# Patient Record
Sex: FEMALE | Race: WHITE | NOT HISPANIC OR LATINO | Employment: STUDENT | URBAN - METROPOLITAN AREA
[De-identification: names, ages, dates, MRNs, and addresses within clinical notes are randomized per-mention and may not be internally consistent; named-entity substitution may affect disease eponyms.]

---

## 2020-08-14 ENCOUNTER — OFFICE VISIT (OUTPATIENT)
Dept: FAMILY MEDICINE CLINIC | Facility: CLINIC | Age: 20
End: 2020-08-14

## 2020-08-14 ENCOUNTER — HOSPITAL ENCOUNTER (OUTPATIENT)
Dept: GENERAL RADIOLOGY | Facility: HOSPITAL | Age: 20
Discharge: HOME | End: 2020-08-14
Admitting: FAMILY MEDICINE

## 2020-08-14 VITALS
SYSTOLIC BLOOD PRESSURE: 118 MMHG | RESPIRATION RATE: 16 BRPM | OXYGEN SATURATION: 99 % | HEIGHT: 67 IN | DIASTOLIC BLOOD PRESSURE: 64 MMHG | TEMPERATURE: 98.2 F | BODY MASS INDEX: 20.25 KG/M2 | HEART RATE: 76 BPM | WEIGHT: 129 LBS

## 2020-08-14 DIAGNOSIS — S39.012S STRAIN OF LUMBAR REGION, SEQUELA: ICD-10-CM

## 2020-08-14 DIAGNOSIS — Z00.00 HEALTH CARE MAINTENANCE: Primary | ICD-10-CM

## 2020-08-14 DIAGNOSIS — N94.6 DYSMENORRHEA: ICD-10-CM

## 2020-08-14 DIAGNOSIS — L72.3 SEBACEOUS CYST: ICD-10-CM

## 2020-08-14 PROCEDURE — 90715 TDAP VACCINE 7 YRS/> IM: CPT | Performed by: FAMILY MEDICINE

## 2020-08-14 PROCEDURE — 90471 IMMUNIZATION ADMIN: CPT | Performed by: FAMILY MEDICINE

## 2020-08-14 PROCEDURE — 72100 X-RAY EXAM L-S SPINE 2/3 VWS: CPT

## 2020-08-14 PROCEDURE — 90472 IMMUNIZATION ADMIN EACH ADD: CPT | Performed by: FAMILY MEDICINE

## 2020-08-14 PROCEDURE — 99204 OFFICE O/P NEW MOD 45 MIN: CPT | Performed by: FAMILY MEDICINE

## 2020-08-14 PROCEDURE — 90734 MENACWYD/MENACWYCRM VACC IM: CPT | Performed by: FAMILY MEDICINE

## 2020-08-14 NOTE — PROGRESS NOTES
"Augustina Aguila is a 19 y.o. female.     History of Present Illness     The following portions of the patient's history were reviewed and updated as appropriate: {history reviewed:20406::\"allergies\",\"current medications\",\"past family history\",\"past medical history\",\"past social history\",\"past surgical history\",\"problem list\"}.  Vitals:    08/14/20 1122   BP: 118/64   Pulse: 76   Resp: 16   Temp: 98.2 °F (36.8 °C)   SpO2: 99%     Body mass index is 20.2 kg/m².  Review of Systems    Objective   Physical Exam        Assessment/Plan   {Assess/PlanSmartLinks:77268}           "

## 2020-08-14 NOTE — PROGRESS NOTES
"Augustina Aguila is a 19 y.o. female and is here for a comprehensive physical exam. The patient reports back pain.   Establish care  1. Back pain- started 2 years ago after lifting and felt low back pain. 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 Has had tingling down right leg. Pain can also be worse with sitting. Has taken Ibuprofen and muscle relaxant cream little relief. Has used ice and heat some relief. Currently working at [employer]. Standing does not hurt back.   2. Birth control- [facility] Has had for 8-9 months. Continues to have bleeding and pain with periods and was told that this should get better on IUD.   3. Lump on front of right leg, non tender    Last health maintenance visit was > 1 year . Overall they feel their health is good . Lives with domestic partner  Occupation is at [employer]. Patient's diet is in general, a \"healthy\" diet  . Exercises regularly irregularly . Tobacco use Never used. Alcohol use is none . Illicit drug no history of illicit drug use    Screening Tests  Vision Impairment. Uses Glasses/Contacts   Hearingnormal  Dental: Brushes does teeth twice a day . Dental exam every six months?yes  Mammogram up to date n/a  Pap smear n/a  Colonoscopy n/a  Dexa Scan n/a    Do you take any herbs or supplements that were not prescribed by a doctor? no  Are you taking calcium supplements? no  Are you taking aspirin daily? no  Family history of ovarian cancer? no  Family history of breast cancer? no  FH of endometrial cancer? no  FH of cervical cancer? no  FH of colon cancer? no       History:  LMP: No LMP recorded.    Last pap date: n/a  Abnormal pap? n/a  : 0  Para: 0    Immunization History  Tdap? yes  HPV? yes  Pneumonia? not applicable  Shingles? not applicable    The following portions of the patient's history were reviewed and updated as appropriate: allergies, current " medications, past family history, past medical history, past social history, past surgical history and problem list.    History reviewed. No pertinent past medical history.    Family History   Problem Relation Age of Onset   • Epilepsy Mother    • ADD / ADHD Father        Past Surgical History:   Procedure Laterality Date   • DENTAL PROCEDURE  2017    molar pulled       Social History     Socioeconomic History   • Marital status: Single     Spouse name: Not on file   • Number of children: Not on file   • Years of education: Not on file   • Highest education level: Not on file   Tobacco Use   • Smoking status: Never Smoker   • Smokeless tobacco: Never Used   Substance and Sexual Activity   • Alcohol use: Never     Frequency: Never   • Drug use: Never   • Sexual activity: Defer       Review of Systems  Do you have pain that bothers you in your daily life? no  Review of Systems   Constitutional: Negative.  Negative for activity change, fatigue, fever and unexpected weight change.   HENT: Negative.  Negative for congestion, sneezing and sore throat.    Eyes: Negative.  Negative for visual disturbance.   Respiratory: Negative.  Negative for cough, chest tightness, shortness of breath and wheezing.    Cardiovascular: Negative.  Negative for chest pain, palpitations and leg swelling.   Gastrointestinal: Negative.  Negative for abdominal distention, abdominal pain, blood in stool, constipation, diarrhea and nausea.   Endocrine: Negative.  Negative for cold intolerance and heat intolerance.   Genitourinary: Negative.  Negative for dysuria, frequency and urgency.   Musculoskeletal: Negative.  Negative for arthralgias and myalgias.   Skin: Negative.  Negative for rash.   Allergic/Immunologic: Negative.    Neurological: Negative.  Negative for dizziness, syncope and numbness.   Hematological: Negative.  Negative for adenopathy.   Psychiatric/Behavioral: Negative.  Negative for suicidal ideas. The patient is not nervous/anxious.         Objective   Physical Exam   Constitutional: She is oriented to person, place, and time. She appears well-developed and well-nourished. No distress.   HENT:   Right Ear: External ear normal.   Left Ear: External ear normal.   Nose: Nose normal.   Mouth/Throat: Oropharynx is clear and moist.   Eyes: Pupils are equal, round, and reactive to light. Conjunctivae and EOM are normal.   Neck: Normal range of motion. Neck supple. No thyromegaly present.   Cardiovascular: Normal rate, regular rhythm and normal heart sounds.   No murmur heard.  Pulmonary/Chest: Effort normal and breath sounds normal. No respiratory distress. She has no wheezes.   Abdominal: Soft. Bowel sounds are normal. She exhibits no distension and no mass. There is no tenderness. There is no rebound and no guarding. No hernia.   Musculoskeletal: Normal range of motion. She exhibits no edema.        Lumbar back: She exhibits tenderness and spasm.   Lymphadenopathy:     She has no cervical adenopathy.   Neurological: She is alert and oriented to person, place, and time. She has normal reflexes.   Skin: Skin is warm and dry. No rash noted. She is not diaphoretic. No erythema. No pallor.   Small seb cyst anterior right lower leg, nontender   Psychiatric: She has a normal mood and affect. Her behavior is normal. Judgment and thought content normal.   Nursing note and vitals reviewed.       Diagnoses and all orders for this visit:    Health care maintenance  -     Tdap Vaccine Greater Than or Equal To 8yo IM  -     Discontinue: meningococcal polysaccharide (MENACTRA) injection 0.5 mL  -     meningococcal (MENVEO) vaccine 0.5 mL    Dysmenorrhea  -     Ambulatory Referral to Obstetrics / Gynecology    Strain of lumbar region, sequela  -     XR Spine Lumbar 2 or 3 View; Future  -     Ambulatory Referral to Physical Therapy Evaluate and treat    Sebaceous cyst         Patient Counseling:   --BMI: Discussed that it is acceptable and appropriate                  Plan.  --Nutrition: Stressed importance of moderation in sodium/caffeine intake, saturated fat and cholesterol, caloric balance, sufficient intake of fresh fruits, vegetables, fiber, calcium, iron, and 1 mg of folate supplement per day (for females capable of pregnancy).  ---Exercise: Stressed the importance of regular exercise.   --Substance Abuse: Discussed cessation/primary prevention of tobacco, alcohol, or other drug use; driving or other dangerous activities under the influence; availability of treatment for abuse.    --Sexuality: Discussed sexually transmitted diseases, partner selection, use of condoms, avoidance of unintended pregnancy  and contraceptive alternatives.   --Injury prevention: Discussed safety belts, safety helmets, smoke detector, smoking near bedding or upholstery.   --Dental health: Discussed importance of regular tooth brushing, flossing, and dental visits.  --Immunizations reviewed.  --Discussed benefits of mammogram  --Discussed benefits of screening colonoscopy.  --After hours service discussed with patient    Discussed the nature of the disease including, risks, complications, implications, management, safe and proper use of medications. Encouraged therapeutic lifestyle changes including healthy diet with plenty of fruits and vegetables, daily exercise, medication compliance, and keeping scheduled follow up appointments with me and any other providers. Encouraged patient to have appointment for complete physical, fasting labs, appropriate screenings, and immunizations on an annual basis.       Follow up as needed for acute illness

## 2020-08-15 PROBLEM — Z01.419 WELL WOMAN EXAM: Status: ACTIVE | Noted: 2020-08-15

## 2020-08-20 ENCOUNTER — OFFICE VISIT (OUTPATIENT)
Dept: OBSTETRICS AND GYNECOLOGY | Facility: CLINIC | Age: 20
End: 2020-08-20

## 2020-08-20 VITALS
BODY MASS INDEX: 21.93 KG/M2 | WEIGHT: 140 LBS | RESPIRATION RATE: 14 BRPM | DIASTOLIC BLOOD PRESSURE: 74 MMHG | SYSTOLIC BLOOD PRESSURE: 118 MMHG

## 2020-08-20 DIAGNOSIS — Z97.5 IUD (INTRAUTERINE DEVICE) IN PLACE: ICD-10-CM

## 2020-08-20 DIAGNOSIS — N92.6 IRREGULAR MENSES: Primary | ICD-10-CM

## 2020-08-20 PROCEDURE — 99203 OFFICE O/P NEW LOW 30 MIN: CPT | Performed by: OBSTETRICS & GYNECOLOGY

## 2020-08-20 NOTE — PROGRESS NOTES
Subjective   Chief Complaint   Patient presents with   • Gynecologic Exam       Daisy Aguila is a 19 y.o. year old No obstetric history on file..  Patient's last menstrual period was 08/01/2020 (approximate).  She presents because of wanting to get her IUD checked.  She had a Kyleena placed in early October 2019.  She wants to get it checked because she is that she is been having some unpredictable cycles pelvic pain in bilateral lower quadrants in the suprapubic region.  The unpredictable bleeding and the pain neither are bad enough that she would want her IUD removed if we can prove that is correctly positioned.    The following portions of the patient's history were reviewed and updated as appropriate:current medications, allergies, past family history, past medical history, past social history and past surgical history    Social History    Tobacco Use      Smoking status: Never Smoker      Smokeless tobacco: Never Used         Objective   /74   Resp 14   Wt 63.5 kg (140 lb)   LMP 08/01/2020 (Approximate)   Breastfeeding No   BMI 21.93 kg/m²     Lab Review   No data reviewed    Imaging   No data reviewed        Assessment   1. Irregular menses with Kyleena in place.  This is a new problem that does require additional work-up     Plan   1. Ultrasound needs to be done any time that is convenient for her.   2. The importance of keeping all planned follow-up and taking all medications as prescribed was emphasized.  3. Follow up after ultrasound          This note was electronically signed.    Tutu Boyce M.D.  August 20, 2020    Total time spent today with Daisy  was 35 minutes (level 3).  Off this time, 90% was spent face-to-face time coordinating care, answering her questions and counseling regarding pathophysiology of her presenting problem along with plans for any diagnositc work-up and treatment.    Note: Speech recognition transcription software may have been used to create  portions of this document.  An attempt at proofreading has been made but errors in transcription could still be present.

## 2020-08-24 ENCOUNTER — TELEPHONE (OUTPATIENT)
Dept: FAMILY MEDICINE CLINIC | Facility: CLINIC | Age: 20
End: 2020-08-24

## 2020-08-24 NOTE — TELEPHONE ENCOUNTER
Caller: Daisy Aguila    Relationship: Self    Best call back number:[phone]     Medication needed:   PATIENT STATES SHE SAW TR[facility]GADO ON 8/14/20 FOR A NEW PATIENT APPT AND WAS TOLD 3 MEDICATION WOULD BE SENT IN FOR HER AND NOTHING WAS SENT IN.    PATIENT WAS ALSO REQUESTING RESULTS FROM HER X-RAY      What is the patient's preferred pharmacy: [facility] [address] AT [facility] & [address] - [phone]  - [phone] FX

## 2020-08-25 DIAGNOSIS — S39.012S STRAIN OF LUMBAR REGION, SEQUELA: Primary | ICD-10-CM

## 2020-08-25 RX ORDER — CYCLOBENZAPRINE HCL 10 MG
5 TABLET ORAL NIGHTLY PRN
Qty: 30 TABLET | Refills: 0 | Status: SHIPPED | OUTPATIENT
Start: 2020-08-25 | End: 2021-06-17

## 2020-08-28 ENCOUNTER — TREATMENT (OUTPATIENT)
Dept: PHYSICAL THERAPY | Facility: CLINIC | Age: 20
End: 2020-08-28

## 2020-08-28 DIAGNOSIS — M54.41 CHRONIC MIDLINE LOW BACK PAIN WITH RIGHT-SIDED SCIATICA: Primary | ICD-10-CM

## 2020-08-28 DIAGNOSIS — G89.29 CHRONIC MIDLINE LOW BACK PAIN WITH RIGHT-SIDED SCIATICA: Primary | ICD-10-CM

## 2020-08-28 PROCEDURE — 97110 THERAPEUTIC EXERCISES: CPT | Performed by: PHYSICAL THERAPIST

## 2020-08-28 PROCEDURE — 97161 PT EVAL LOW COMPLEX 20 MIN: CPT | Performed by: PHYSICAL THERAPIST

## 2020-08-28 NOTE — PATIENT INSTRUCTIONS
Issued initial HEP including LTR, ITB stretch, prayer stretch, hamstring stretches to be performed 2x/day

## 2020-08-28 NOTE — PROGRESS NOTES
Physical Therapy Initial Evaluation and Plan of Care      Patient: Daisy Aguila   : 2000  Diagnosis/ICD-10 Code:  Chronic midline low back pain with right-sided sciatica [M54.41, G89.29]  Referring practitioner: Mariangel Khan DO  Date of Initial Visit: 2020  Today's Date: 2020  Patient seen for 1 sessions           Subjective Questionnaire: Oswestry:   Subjective Evaluation    History of Present Illness  Mechanism of injury: Pt reports that for the past few years she has dealt w/ lower back pn. Two years ago, helped her grandmother carry sodas in from the car, bent down to pick them up and felt a snap in her back. Was unable to get out of bed for 2 days. Was given mm relaxer and exercises from her PCP, but haven't helped. Back in  bent forward to  a small child she was watching and had another episode of severe sharp pn. Has sharp pn if sitting for long periods, even if she tries to correct her posture. Can also occur w/ prolonged standing. Pn located across both sides of her low back, occasionally will radiate down her right leg to her knee. Denies numbness, tingling, weakness, etc.    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    Hand dominance: right    Diagnostic Tests  MRI studies: normal    Treatments  Previous treatment: medication  Current treatment: medication  Patient Goals  Patient goals for therapy: decreased pain, increased motion, increased strength, independence with ADLs/IADLs and return to sport/leisure activities           Treatment               Objective          Tenderness     Additional Tenderness Details  No TTP    Neurological Testing     Sensation     Lumbar   Left   Intact: light touch    Right   Intact:  light touch    Reflexes   Left   Patellar (L4): normal (2+)  Achilles (S1): normal (2+)    Right   Patellar (L4): normal (2+)  Achilles (S1): normal (2+)    Active Range of Motion     Additional Active Range of Motion Details  Trunk Flxn fingertips to mid/distal shin w/ pn midline lower back   Trunk Extn WNL without pn  Trunk Rotation L WNL R WNL without pn  Trunk SB L WNL   R WNL without pn      Passive Range of Motion     Additional Passive Range of Motion Details  Mild pn, hypomobility w/ unilateral PA glides lumbar segments bilaterally    Strength/Myotome Testing     Left Hip   Planes of Motion   Flexion: 5  Extension: 4+  Abduction: 5  External rotation: 5    Right Hip   Planes of Motion   Flexion: 5  Extension: 4+  Abduction: 5  External rotation: 5    Left Knee   Flexion: 5  Extension: 5    Right Knee   Flexion: 5  Extension: 5    Left Ankle/Foot   Dorsiflexion: 5  Plantar flexion: 5  Great toe extension: 5    Right Ankle/Foot   Dorsiflexion: 5  Plantar flexion: 5  Great toe extension: 5    Tests       Thoracic   Negative slump.     Lumbar     Left   Negative passive SLR and quadrant.     Right   Negative passive SLR and quadrant.     Left Pelvic Girdle/Sacrum   Negative: sacrum compression, gapping, sacral spring and thigh thrust.     Right Pelvic Girdle/Sacrum   Negative: sacrum compression, gapping, sacral spring and thigh thrust.     Left Hip Flexibility Comments:   Hamstring: mild impairment  Quad: no impairment  Hip Flexor: mild impairment  Piriformis: no impairment  IT Band: mild impairment  Gastroc: no impairment  Soleus: no impairment      Right Hip Flexibility Comments:   Hamstring: mild impairment  Quad: no impairment  Hip Flexor: mild impairment  Piriformis: no impairment  IT Band: mild impairment  Gastroc: no impairment  Soleus: no impairment          Assessment & Plan     Assessment  Impairments: abnormal or restricted ROM, activity intolerance, impaired physical strength, lacks appropriate home  exercise program and pain with function  Assessment details: Pt is a 19 YOF who presents to PT w/ evolving symptoms of low complexity. She reports chronic mid line lower back pn w/ occasional pn radiating into the lateral/anterior thigh. (-) distal LE symptoms, paresthesias. She exhibits full trunk mobility w/ report of mild pn and stretching at end range forward bending. She is hypomobile throughout the lumbar segments and exhibits impaired flexibility of the hamstrings, ITBs, lumbar paraspinals. Strength overall intact. SLR/slump (-), SI provocation tests (-). Pt's pn and deficits limit her ability to tolerate daily and work activities. She may benefit from skilled PT services to address her deficits, decrease pn, and maximize function.  Barriers to therapy: none  Prognosis: good  Functional Limitations: carrying objects, lifting, sleeping, walking, pulling, pushing, uncomfortable because of pain, sitting, standing and stooping  Goals  Plan Goals: STG 6 wks  1) Pt to be compliant w/ initial HEP for ROM, strength and symptom mgmt.  2) Pt to report at least a 70% improvement in symptoms.  3) Pt to exhibit full trunk mobility in all planes w/o pn.  4) Pt to exhibit impaired hamstring, ITB flexibility.  5) Pt to improve Mod Oswestry score to 7/50 or better to reflect improved pn and function.        Plan  Therapy options: will be seen for skilled physical therapy services  Planned modality interventions: TENS, thermotherapy (hydrocollator packs), traction and ultrasound  Planned therapy interventions: abdominal trunk stabilization, ADL retraining, body mechanics training, flexibility, functional ROM exercises, home exercise program, joint mobilization, manual therapy, neuromuscular re-education, postural training, soft tissue mobilization, spinal/joint mobilization, strengthening, stretching and therapeutic activities  Frequency: 1x week  Duration in visits: 10  Treatment plan discussed with: patient  Plan details: PT  POC to emphasize restoration of pn free trunk mobility, flexibility, core/hip strength, segmental mobility utilizing therex, manual therapy techniques, modalities as indicated for pn control.        Timed:  Manual Therapy:    0     mins  85817;  Therapeutic Exercise:    8     mins  77552;     Neuromuscular Dylan:    0    mins  10741;    Therapeutic Activity:     0     mins  87947;     Gait Trainin     mins  32511;     Ultrasound:     0     mins  35067;    Electrical Stimulation:    0     mins  41695 ( );    Untimed:  Electrical Stimulation:    0     mins  93418 ( );  Mechanical Traction:    0     mins  25543;     Timed Treatment:   8   mins   Total Treatment:     40   mins    PT SIGNATURE: Bev Ridley, PT   DATE TREATMENT INITIATED: 2020    Initial Certification  Certification Period: 2020  I certify that the therapy services are furnished while this patient is under my care.  The services outlined above are required by this patient, and will be reviewed every 90 days.     PHYSICIAN: Mariangel Khan,       DATE:     Please sign and return via fax to [phone].. Thank you, [facility].

## 2020-09-03 ENCOUNTER — TREATMENT (OUTPATIENT)
Dept: PHYSICAL THERAPY | Facility: CLINIC | Age: 20
End: 2020-09-03

## 2020-09-03 DIAGNOSIS — M54.41 CHRONIC MIDLINE LOW BACK PAIN WITH RIGHT-SIDED SCIATICA: Primary | ICD-10-CM

## 2020-09-03 DIAGNOSIS — G89.29 CHRONIC MIDLINE LOW BACK PAIN WITH RIGHT-SIDED SCIATICA: Primary | ICD-10-CM

## 2020-09-03 PROCEDURE — 97110 THERAPEUTIC EXERCISES: CPT | Performed by: PHYSICAL THERAPIST

## 2020-09-03 NOTE — PROGRESS NOTES
Physical Therapy Daily Progress Note  Visit: 2      Patient: Daisy Aguila   : 2000  Referring practitioner: Mariangel Khan, DO  Visit Diagnosis:     ICD-10-CM ICD-9-CM   1. Chronic midline low back pain with right-sided sciatica M54.41 724.2    G89.29 724.3     338.29     Date of Initial Visit: Type: THERAPY  Noted: 2020  Today's Date: 9/3/2020        Subjective     Daisy Aguila reports: Pt states she has been under stress, so yesterday pn was elevated. No pn at time of visit this morning but feels tightness in the center of her lower back.    Treatment  Pre Treatment Pn Score: 0  Post Treatment Pn Score:  0    Exercise 1  Exercise Name 1: seated hamstring stretch  Sets/Reps 1: 3  Time: 15 sec  Exercise 2  Exercise Name 2: standing ITB stretch  Sets/Reps 2: 3  Time 2: 15 sec  Exercise 3  Exercise Name 3: 3 way prayer stretch  Sets/Reps 3: 3  Time 3: 15 sec  Exercise 4  Exercise Name 4: cat/camel  Sets/Reps 4: 10  Exercise 5  Exercise Name 5: LTR  Sets/Reps 5: 10  Exercise 6  Exercise Name 6: PPT  Sets/Reps 6: 15  Exercise 7  Exercise Name 7: LE glides  Sets/Reps 7: 15  Exercise 8  Exercise Name 8: birddogs  Sets/Reps 8: 15  Exercise 9  Exercise Name 9: bridges  Sets/Reps 9: 30            Objective      Reports tightness mid low back w/ forward bending, ROM WNL     Assessment & Plan     Assessment  Assessment details: Pt compliant w/ initial HEP. She reports mild soreness from initial evaluation but subjectively reports some relief from her stretches. Reviewed stretches in clinic, followed by initiation of low level trans ab isometrics, core stab. Pt tolerated these very well, reported quite a bit of challenge w/ bird dogs but performed w/ good technique. Reviewed palpation of core stabilizers to ensure proper technique w/ PPT. Added PPT, LE glides, and bird dogs to HEP.    Plan  Plan details: Cont w/ POC               Timed:  Manual Therapy:    0     mins  66008;  Therapeutic  Exercise:    38     mins  08497;     Neuromuscular Dylan:    0    mins  82969;    Therapeutic Activity:     0     mins  20861;     Gait Trainin     mins  90323;     Ultrasound:     0     mins  04756;    Electrical Stimulation:    0     mins  74596 ( );    Untimed:  Electrical Stimulation:    0     mins  97154 ( );  Mechanical Traction:    0     mins  92523;     Timed Treatment:   38   mins   Total Treatment:     38   mins      Bev Ridley, PT  Physical Therapist

## 2020-09-11 ENCOUNTER — TREATMENT (OUTPATIENT)
Dept: PHYSICAL THERAPY | Facility: CLINIC | Age: 20
End: 2020-09-11

## 2020-09-11 DIAGNOSIS — G89.29 CHRONIC MIDLINE LOW BACK PAIN WITH RIGHT-SIDED SCIATICA: Primary | ICD-10-CM

## 2020-09-11 DIAGNOSIS — M54.41 CHRONIC MIDLINE LOW BACK PAIN WITH RIGHT-SIDED SCIATICA: Primary | ICD-10-CM

## 2020-09-11 PROCEDURE — 97110 THERAPEUTIC EXERCISES: CPT | Performed by: PHYSICAL THERAPIST

## 2020-09-11 NOTE — PROGRESS NOTES
Physical Therapy Daily Progress Note  Visit: 3      Patient: Daisy Aguila   : 2000  Referring practitioner: Mariangel Khan, DO  Visit Diagnosis:     ICD-10-CM ICD-9-CM   1. Chronic midline low back pain with right-sided sciatica M54.41 724.2    G89.29 724.3     338.29     Date of Initial Visit: Type: THERAPY  Noted: 2020  Today's Date: 2020        Subjective     Daisy Aguila reports: Pt states her pn has been increased over the past week, she believes due to stress. However she did have quite a bit of relief after leaving her last PT appt.    Treatment  Pre Treatment Pn Score: 5  Post Treatment Pn Score:  3    Exercise 1  Exercise Name 1: cat/camel stretch  Sets/Reps 1: 10 ea  Exercise 2  Exercise Name 2: 3 way prayer stretch  Sets/Reps 2: 3  Time 2: 15 sec  Exercise 3  Exercise Name 3: quadruped multifidus lifts  Sets/Reps 3: 20 ea  Exercise 4  Exercise Name 4: birddog  Sets/Reps 4: 20  Exercise 5  Exercise Name 5: PPT  Sets/Reps 5: 30  Exercise 6  Exercise Name 6: LE glides  Sets/Reps 6: 30  Exercise 7  Exercise Name 7: cyclying  Sets/Reps 7: 30  Exercise 8  Exercise Name 8: Paloff presses  Equipment/Resistance 8: GTB  Sets/Reps 8: 20 ea            Objective       Assessment & Plan     Assessment  Assessment details: Pt c/o increased pn over the past week due to stress. She notes some pn relief w/ her HEP. She demonstrates good carryover w/ PPT technique, and was able to progress today to cycling w/ trans ab isometric as well as standing paloff presses w/ TB. Issued TB and discussed progressing to presses and cycling at home.    Plan  Plan details: Cont w/ POC- may add plank activities at table edge               Timed:  Manual Therapy:    0     mins  25263;  Therapeutic Exercise:    25     mins  22408;     Neuromuscular Dylan:    0    mins  51676;    Therapeutic Activity:     0     mins  22708;     Gait Trainin     mins  20807;     Ultrasound:     0     mins  56375;     Electrical Stimulation:    0     mins  11565 ( );    Untimed:  Electrical Stimulation:    0     mins  91684 ( );  Mechanical Traction:    0     mins  60091;     Timed Treatment:   25   mins   Total Treatment:     28   mins      Bev Ridley, PT  Physical Therapist

## 2020-09-18 ENCOUNTER — TREATMENT (OUTPATIENT)
Dept: PHYSICAL THERAPY | Facility: CLINIC | Age: 20
End: 2020-09-18

## 2020-09-18 DIAGNOSIS — G89.29 CHRONIC MIDLINE LOW BACK PAIN WITH RIGHT-SIDED SCIATICA: Primary | ICD-10-CM

## 2020-09-18 DIAGNOSIS — M54.41 CHRONIC MIDLINE LOW BACK PAIN WITH RIGHT-SIDED SCIATICA: Primary | ICD-10-CM

## 2020-09-18 PROCEDURE — 97110 THERAPEUTIC EXERCISES: CPT | Performed by: PHYSICAL THERAPIST

## 2020-09-18 NOTE — PROGRESS NOTES
Physical Therapy Daily Progress Note  Visit: 4      Patient: Daisy Aguila   : 2000  Referring practitioner: Mariangel Khan, DO  Visit Diagnosis:     ICD-10-CM ICD-9-CM   1. Chronic midline low back pain with right-sided sciatica  M54.41 724.2    G89.29 724.3     338.29     Date of Initial Visit: Type: THERAPY  Noted: 2020  Today's Date: 2020        Subjective     Daisy Aguila reports: Pt states that pn is getting much better. Had a little pn on Wednesday but did her HEP 3x that day and alleviated it completely. Feels that she is getting stronger, exercises getting easier.    Treatment  Pre Treatment Pn Score: 0  Post Treatment Pn Score:  0    Exercise 1  Exercise Name 1: quadruped multifidus lifts  Sets/Reps 1: 20 ea  Exercise 2  Exercise Name 2: birddog  Sets/Reps 2: 20  Exercise 3  Exercise Name 3: deadbug  Sets/Reps 3: 20  Exercise 4  Exercise Name 4: paloff presses  Equipment/Resistance 4: GTB  Sets/Reps 4: 20 ea  Exercise 5  Exercise Name 5: high plank w/ mountain climbers  Sets/Reps 5: 20  Exercise 6  Exercise Name 6: high plank w/ bird dog  Sets/Reps 6: 30            Objective       Assessment & Plan     Assessment  Assessment details: Pt subjectively reports significant improvement in back pn, strength. She is tolerating exercise progressions very well. She exhibits appropriate technique w/ all and denies any pn. She appears to be adequately challenged w/ current HEP. Discussed options for exercise progression. No pn at end of visit.    Plan  Plan details: Cont w/ POC               Timed:  Manual Therapy:    0     mins  17744;  Therapeutic Exercise:    24     mins  07546;     Neuromuscular Dylan:    0    mins  31812;    Therapeutic Activity:     0     mins  13145;     Gait Trainin     mins  76695;     Ultrasound:     0     mins  08187;    Electrical Stimulation:    0     mins  52210 ( );    Untimed:  Electrical Stimulation:    0     mins  90898 (  );  Mechanical Traction:    0     mins  60318;     Timed Treatment:   24   mins   Total Treatment:     26   mins      Bev Ridley, PT  Physical Therapist

## 2020-09-25 ENCOUNTER — TREATMENT (OUTPATIENT)
Dept: PHYSICAL THERAPY | Facility: CLINIC | Age: 20
End: 2020-09-25

## 2020-09-25 DIAGNOSIS — M54.41 CHRONIC MIDLINE LOW BACK PAIN WITH RIGHT-SIDED SCIATICA: Primary | ICD-10-CM

## 2020-09-25 DIAGNOSIS — G89.29 CHRONIC MIDLINE LOW BACK PAIN WITH RIGHT-SIDED SCIATICA: Primary | ICD-10-CM

## 2020-09-25 PROCEDURE — 97110 THERAPEUTIC EXERCISES: CPT | Performed by: PHYSICAL THERAPIST

## 2020-09-25 NOTE — PROGRESS NOTES
Re-Assessment / Re-Certification      Patient: Daisy Aguila   : 2000  Diagnosis/ICD-10 Code:  Chronic midline low back pain with right-sided sciatica [M54.41, G89.29]  Referring practitioner: Mariangel Khan DO  Date of Initial Visit: Type: THERAPY  Noted: 2020  Today's Date: 2020  Patient seen for 5 sessions      Subjective:   Subjective Questionnaire: Oswestry:   Clinical Progress: improved  Home Program Compliance: Yes  Treatment has included: therapeutic exercise and manual therapy    Subjective    Daisy Aguila reports: Pt states that overall her pn is much better. She feels that the exercises are helping to make her stronger. She did have some pn in the low back the past couple of nights. She slept on her stomach and when she woke felt some pn in the low back, but subsided immediately. She reports continued pn in anterior thighs/knees when sitting long periods, usually when driving.    Pre tx pn score: 0  Post tx pn score: 0      Treatment  Exercise 1  Exercise Name 1: Reassessment  Exercise 2  Exercise Name 2: dead bug  Sets/Reps 2: 30  Exercise 3  Exercise Name 3: bridge w/ alternating leg extn  Sets/Reps 3: 15 ea  Exercise 4  Exercise Name 4: bird dogs  Sets/Reps 4: 30  Exercise 5  Exercise Name 5: bird dog w/ knee/elbow touches  Sets/Reps 5: 15 ea  Exercise 6  Exercise Name 6: high plank w/ mountain climbers  Sets/Reps 6: 30  Exercise 7  Exercise Name 7: high plank w/ bird dog  Sets/Reps 7: 30                   Objective          Tenderness     Additional Tenderness Details  No TTP    Neurological Testing     Sensation     Lumbar   Left   Intact: light touch    Right   Intact: light touch    Reflexes   Left   Patellar (L4): normal (2+)  Achilles (S1): normal (2+)    Right   Patellar (L4): normal (2+)  Achilles (S1): normal (2+)    Active Range of Motion     Additional Active Range of Motion Details  Trunk Flxn fingertips to toes, mild stretch/discomfort B low back  Trunk  Extn WNL without pn  Trunk Rotation L WNL R WNL without pn  Trunk SB L WNL   R WNL without pn      Passive Range of Motion     Additional Passive Range of Motion Details  No pn or mobility deficits unilateral PA glides lumbar segments bilaterally    Strength/Myotome Testing     Left Hip   Planes of Motion   Flexion: 5  Extension: 5  Abduction: 5  External rotation: 5    Right Hip   Planes of Motion   Flexion: 5  Extension: 5  Abduction: 5  External rotation: 5    Left Knee   Flexion: 5  Extension: 5    Right Knee   Flexion: 5  Extension: 5    Left Ankle/Foot   Dorsiflexion: 5  Plantar flexion: 5  Great toe extension: 5    Right Ankle/Foot   Dorsiflexion: 5  Plantar flexion: 5  Great toe extension: 5    Tests       Thoracic   Negative slump.     Lumbar     Left   Negative passive SLR and quadrant.     Right   Negative passive SLR and quadrant.     Left Pelvic Girdle/Sacrum   Negative: sacrum compression, gapping, sacral spring and thigh thrust.     Right Pelvic Girdle/Sacrum   Negative: sacrum compression, gapping, sacral spring and thigh thrust.     Left Hip Flexibility Comments:   Hamstring: no impairment  Quad: no impairment  Hip Flexor: no impairment  Piriformis: no impairment  IT Band: mild impairment  Gastroc: no impairment  Soleus: no impairment      Right Hip Flexibility Comments:   Hamstring: no impairment  Quad: no impairment  Hip Flexor: no impairment  Piriformis: no impairment  IT Band: mild impairment  Gastroc: no impairment  Soleus: no impairment          Assessment & Plan     Assessment  Impairments: abnormal or restricted ROM, impaired physical strength and pain with function  Assessment details: Reassessment performed today. Pt has completed 5 visits w/ interventions focusing on core stabilization, hip strengthening, flexibility, and joint mobility. She has progressed very well w/ PT. She exhibits full trunk mobility w/ only slight discomfort/tightness end range forward flexion. BLE strength 5/5 in  all planes. LE flexibility improved, though B ITBs remain slightly limited. She has tolerated core stab progressions very well. She is making good progress toward her goals and would benefit from continued PT to further improve core strength and endurance. Issued updated HEP today as outlined in chart.  Barriers to therapy: none  Prognosis: good  Functional Limitations: carrying objects, lifting, pulling, pushing, uncomfortable because of pain, sitting and stooping  Goals  Plan Goals: STG 6 wks  1) Pt to be compliant w/ initial HEP for ROM, strength and symptom mgmt.-MET  2) Pt to report at least a 70% improvement in symptoms.-MET  3) Pt to exhibit full trunk mobility in all planes w/o pn.-PARTIALLY MET  4) Pt to exhibit restored hamstring, ITB flexibility.-PARTIALLY MET  5) Pt to improve Mod Oswestry score to 7/50 or better to reflect improved pn and function.-MET        Plan  Therapy options: will be seen for skilled physical therapy services  Planned modality interventions: TENS, thermotherapy (hydrocollator packs), traction and ultrasound  Planned therapy interventions: abdominal trunk stabilization, ADL retraining, body mechanics training, flexibility, functional ROM exercises, home exercise program, joint mobilization, manual therapy, neuromuscular re-education, postural training, soft tissue mobilization, spinal/joint mobilization, strengthening, stretching and therapeutic activities  Frequency: 1x week  Duration in visits: 5  Treatment plan discussed with: patient  Plan details: Cont w/ current POC, focusing on maximizing core strength and endurance, hip strength, flexibility.      Progress toward previous goals: Partially Met        PT Signature: Bev Ridley, PT        Timed:  Manual Therapy:    0     mins  22183;  Therapeutic Exercise:    30     mins  84424;     Neuromuscular Dylan:    0    mins  47754;    Therapeutic Activity:     0     mins  17009;     Gait Trainin     mins  99340;      Ultrasound:     0     mins  99409;    Electrical Stimulation:    0     mins  31510 ( );    Untimed:  Electrical Stimulation:    0     mins  75402 ( );  Mechanical Traction:    0     mins  14361;     Timed Treatment:   30   mins   Total Treatment:     32   mins

## 2020-09-25 NOTE — PATIENT INSTRUCTIONS
Additions to HEP: bridge w/ alternating knee extn, dead bug, bird dog w/ knee to elbow touches, high plank mountain climbers, and high plank bird dogs

## 2020-10-01 ENCOUNTER — TELEPHONE (OUTPATIENT)
Dept: PHYSICAL THERAPY | Facility: CLINIC | Age: 20
End: 2020-10-01

## 2020-10-01 NOTE — TELEPHONE ENCOUNTER
Patient called hub to reschedule for something before 1:45 on 10/2. I could not find anything available and could not get anyone to answer the phone at [facility]. Rescheduled patient for 10/22 but if you can get her in sooner, please contact her as she would like to be seen before then. Thanks

## 2020-10-02 ENCOUNTER — TREATMENT (OUTPATIENT)
Dept: PHYSICAL THERAPY | Facility: CLINIC | Age: 20
End: 2020-10-02

## 2020-10-02 DIAGNOSIS — M54.41 CHRONIC MIDLINE LOW BACK PAIN WITH RIGHT-SIDED SCIATICA: Primary | ICD-10-CM

## 2020-10-02 DIAGNOSIS — G89.29 CHRONIC MIDLINE LOW BACK PAIN WITH RIGHT-SIDED SCIATICA: Primary | ICD-10-CM

## 2020-10-02 PROCEDURE — 97110 THERAPEUTIC EXERCISES: CPT | Performed by: PHYSICAL THERAPIST

## 2020-10-02 NOTE — PROGRESS NOTES
Discharge Summary      Patient: Daisy Aguila   : 2000  Diagnosis/ICD-10 Code:  Chronic midline low back pain with right-sided sciatica [M54.41, G89.29]  Referring practitioner: Mariangel Khan DO  Date of Initial Visit: Type: THERAPY  Noted: 2020  Today's Date: 10/2/2020  Patient seen for 6 sessions      Subjective:   Subjective Questionnaire: Oswestry:   Clinical Progress: improved  Home Program Compliance: Yes  Treatment has included: therapeutic exercise and manual therapy    Subjective    Daisy Aguila reports: Pt states that she has had minimal to no discomfort over the past week. She feels challenged by her exercises and is able to perform them w/o pn. She feels that her back is much stronger, hips are less tight.    Pre tx pn score: 0  Post tx pn score: 0      Treatment  Exercise 1  Exercise Name 1: bird dogs  Sets/Reps 1: 30  Exercise 2  Exercise Name 2: bird dogs w/ knee/elbow touches  Sets/Reps 2: 30  Exercise 3  Exercise Name 3: bridge w/ alternating leg extn  Sets/Reps 3: 30  Exercise 4  Exercise Name 4: dead bug  Sets/Reps 4: 30  Exercise 5  Exercise Name 5: high plank w/ mountain climbers  Sets/Reps 5: 30  Exercise 6  Exercise Name 6: high plank w/ bird dog  Sets/Reps 6: 30  Exercise 7  Exercise Name 7: paloff presses  Equipment/Resistance 7: GTB  Sets/Reps 7: 30                Objective       Assessment & Plan     Assessment  Assessment details: Pt has progressed very well w/ PT. She made significant gains in core/hip strength, flexibility, trunk mobility. She reports minimal to no discomfort and has no limitations w/ daily or recreational activities. She has met all goals set for PT and feels appropriately challenged by her HEP. Pt appropriate to d/c from PT.    Barriers to therapy: none  Prognosis: good    Goals  Plan Goals: STG 6 wks  1) Pt to be compliant w/ initial HEP for ROM, strength and symptom mgmt.-MET  2) Pt to report at least a 70% improvement in  symptoms.-MET  3) Pt to exhibit full trunk mobility in all planes w/o pn.-MET  4) Pt to exhibit restored hamstring, ITB flexibility.-PARTIALLY MET  5) Pt to improve Mod Oswestry score to 7/50 or better to reflect improved pn and function.-MET        Plan  Treatment plan discussed with: patient  Plan details: D/C TO INDEPENDENT HEP            PT Signature: Bev Ridley, PT        Timed:  Manual Therapy:    0     mins  57065;  Therapeutic Exercise:    24     mins  42173;     Neuromuscular Dylan:    0    mins  38456;    Therapeutic Activity:     0     mins  73518;     Gait Trainin     mins  15009;     Ultrasound:     0     mins  49473;    Electrical Stimulation:    0     mins  04722 ( );    Untimed:  Electrical Stimulation:    0     mins  56286 ( );  Mechanical Traction:    0     mins  15121;     Timed Treatment:   24   mins   Total Treatment:     26   mins

## 2021-02-25 ENCOUNTER — OFFICE VISIT (OUTPATIENT)
Dept: OBSTETRICS AND GYNECOLOGY | Facility: CLINIC | Age: 21
End: 2021-02-25

## 2021-02-25 VITALS — BODY MASS INDEX: 23.65 KG/M2 | RESPIRATION RATE: 14 BRPM | WEIGHT: 151 LBS

## 2021-02-25 DIAGNOSIS — Z01.419 WELL WOMAN EXAM: Primary | ICD-10-CM

## 2021-02-25 DIAGNOSIS — Z11.8 SCREENING FOR CHLAMYDIAL DISEASE: ICD-10-CM

## 2021-02-25 DIAGNOSIS — N94.6 DYSMENORRHEA: ICD-10-CM

## 2021-02-25 PROCEDURE — 99214 OFFICE O/P EST MOD 30 MIN: CPT | Performed by: OBSTETRICS & GYNECOLOGY

## 2021-02-25 NOTE — PROGRESS NOTES
Subjective   Chief Complaint   Patient presents with   • Menstrual Problem       Daisy Aguila is a 20 y.o. year old .  Patient's last menstrual period was 2021 (approximate).  She presents because of ongoing issues with both pelvic pain and bleeding.  She was seen in October for similar complaints.  At that point neither the bleeding nor the pain was bad enough to do something about.  She had an ultrasound done back in October showed a correctly positioned IUD.  Since October things hardly ever got normal.  Since the end of  her cycles continue to be unpredictable.  She is having pain that at times is unbearable and makes it hard for her to go to work.  The pain is worst when her cycle gets heavy in the first couple of days.  It is mildly bad in the week preceding her cycle.  After the flow starts to settle down the pain goes away as well.  She is sexually active.  Condoms are used.  She is monogamous.    Her cycles are usually pretty predictable.  She may have some prolonged spotting associated with her cycle.  There is no postcoital bleeding    She also has a little bit of vaginal odor at times.  As far as a contraceptive method she is only used Depo-Provera.  She had unpredictable bleeding and really was not a fan of that.  She is not interested in a birth control pill other than a last resort because she has concerns about compliance.    The following portions of the patient's history were reviewed and updated as appropriate:current medications and allergies    Social History    Tobacco Use      Smoking status: Never Smoker      Smokeless tobacco: Never Used         Objective   Resp 14   Wt 68.5 kg (151 lb)   LMP 2021 (Approximate)   Breastfeeding No   BMI 23.65 kg/m²       Pelvis: Clinical staff was present for exam  External genitalia:  normal appearance of the external genitalia including Bartholin's and [place]'s glands.  :  urethral meatus normal;  Vaginal:  normal pink  mucosa without prolapse or lesions.  Cervix:  normal appearance. cervical motion tenderness is absent; IUD string present - 1.5 cms in length;  Uterus:  normal size, shape and consistency. tenderness to palpation is absent;  Adnexa:  normal bimanual exam of the adnexa.  Pelvic floor pain: pelvic floor is nontender to palpation in 4 quadrants.       Lab Review   No data reviewed    Imaging   See HPI        Assessment   1. Dysmenorrhea with associated prolonged menses and patient with IUD.  Previous ultrasound demonstrates correct position.  2. STD screening     Plan   1. Pap and STD testing was done today.  If she does not receive the results of the Pap within 2 weeks  time, she was instructed to call to find out the results.  I explained to Daisy that the recommendations for Pap smear interval in a low risk patient's has lengthened to 3 years time.  I encouraged her to be seen yearly for a full physical exam including breast and pelvic exam even during the off years when PAP's will not be performed.  2. Given her history I really think the IUD needs to be removed.  So far as alternative contraception I think she would do best with either Ortho Evra or NuvaRing.  3. The importance of keeping all planned follow-up and taking all medications as prescribed was emphasized.  4. Follow up for IUD removal after she has been on the patch for at least 1 month    New Medications Ordered This Visit   Medications   • norelgestromin-ethinyl estradiol (ORTHO EVRA) 150-35 MCG/24HR     Sig: Place 1 patch on the skin as directed by provider 1 (One) Time Per Week.     Dispense:  3 patch     Refill:  6          This note was electronically signed.    Tutu Boyce M.D.  February 25, 2021      Note: Speech recognition transcription software may have been used to create portions of this document.  An attempt at proofreading has been made but errors in transcription could still be present.

## 2021-06-17 ENCOUNTER — OFFICE VISIT (OUTPATIENT)
Dept: OBSTETRICS AND GYNECOLOGY | Facility: CLINIC | Age: 21
End: 2021-06-17

## 2021-06-17 VITALS
WEIGHT: 142 LBS | DIASTOLIC BLOOD PRESSURE: 66 MMHG | BODY MASS INDEX: 22.24 KG/M2 | SYSTOLIC BLOOD PRESSURE: 112 MMHG | RESPIRATION RATE: 14 BRPM

## 2021-06-17 DIAGNOSIS — N92.6 IRREGULAR MENSES: Primary | ICD-10-CM

## 2021-06-17 PROCEDURE — 99213 OFFICE O/P EST LOW 20 MIN: CPT | Performed by: OBSTETRICS & GYNECOLOGY

## 2021-06-17 NOTE — PROGRESS NOTES
Subjective   Chief Complaint   Patient presents with   • Gynecologic Exam     Daisy Aguila is a 20 y.o. year old .  Patient's last menstrual period was 2021 (approximate).  She ongoing issues with cycle unpredictability.  When she was first seen last year she had an ultrasound demonstrating correct placement of her IUD.  She had ongoing issues with bleeding and was seen in February.  For that reason a 6-month prescription for Ortho Evra was given.  She took it for about a month and a half.  Her cycles were more regular during that couple of months time but she otherwise did not like the way it made her feel.  She has been off of all forms of contraception other than the IUD for the last month and a half now.  The pain she was having at the time she took the patch is improved but the bleeding has persisted.    OTHER THINGS SHE WANTS TO DISCUSS TODAY:  Nothing else    The following portions of the patient's history were reviewed and updated as appropriate:current medications and allergies    Social History    Tobacco Use      Smoking status: Never Smoker      Smokeless tobacco: Never Used    Review of Systems  Constitutional POS: nothing reported    NEG: anorexia or night sweats   Genitourinary POS: nothing reported    NEG: dysuria or hematuria   Gastointestinal POS: nothing reported    NEG: bloating, change in bowel habits, melena or reflux symptoms   Integument POS: nothing reported    NEG: moles that are changing in size, shape, color or rashes   Breast POS: nothing reported    NEG: persistent breast lump, skin dimpling or nipple discharge         Objective   /66   Resp 14   Wt 64.4 kg (142 lb)   LMP 2021 (Approximate)   Breastfeeding No   BMI 22.24 kg/m²     General:  well developed; well nourished  no acute distress   Skin:  No suspicious lesions seen   Pelvis: Clinical staff was present for exam  External genitalia:  normal appearance of the external genitalia including  Bartholin's and [place]'s glands.  :  urethral meatus normal;  Vaginal:  normal pink mucosa without prolapse or lesions.  Cervix:  normal appearance. IUD string present - 2 cms in length;     Lab Review   No data reviewed    Imaging   No data reviewed        Assessment   1. Irregular menses not impacting day-to-day function.  History is most consistent with anovulatory bleeding     Plan   1. At this point with normal Pap smear, negative STD testing, normal ultrasound position of IUD I think an expectant management approach can be taken unless it becomes bothersome to her  2. The importance of keeping all planned follow-up and taking all medications as prescribed was emphasized.  3. Follow up for annual exam 2/2021          This note was electronically signed.    Tutu Boyce M.D.  June 17, 2021    Note: Speech recognition transcription software may have been used to create portions of this document.  An attempt at proofreading has been made but errors in transcription could still be present.

## 2021-06-17 NOTE — PROGRESS NOTES
"Subjective   Chief Complaint   Patient presents with   • Gynecologic Exam     Daisy Aguila is a 20 y.o. year old  presenting to be seen for her annual exam.     SEXUAL Hx:  She is currently sexually active.  In the past year there {New sexual partners?:22606::\"there has been NO new sexual partners\"}.    Condoms {Condom use:13700::\"are never used\"}.  She {would/WOULD NOT:85903::\"would not\"} like to be screened for STD's at today's exam.  Current birth control method: {Current contraceptive:79643}.  She {IS/is not:29360::\"is\"} happy with her current method of contraception and {does / DOES NOT:96663::\"does not\"} want to discuss alternative methods of contraception.  MENSTRUAL Hx:  Patient's last menstrual period was 2021 (approximate).  In the past 6 months her cycles have been {Desc - menses description:76803::\"regular, predictable and occur monthly\"}.  Her menstrual {Misc; menses description:79526::\"flow is typically normal\"}.   Each month on average there are roughly {Numbers; 0-7:09146::\"0\"} day(s) of very heavy flow.  Intermenstrual bleeding is {absent/present:77991::\"absent\"}.    Post-coital bleeding is {absent/present:82286::\"absent\"}.  Dysmenorrhea: {Affecting ADL?:57759::\"is not affecting her activities of daily living\"}  PMS: {Affecting ADL?:99846::\"is not affecting her activities of daily living\"}  Her cycles {are/ARE NOT:72623::\"are not\"} a source of concern for her that she wishes to discuss today.  HEALTH Hx:  She exercises regularly: {Responses; yes/NO/not asked:65583::\"no (and has no plans to become more active)\"}.  She wears her seat belt: {Responses; yes/not/not always (pre-select yes):27717::\"yes\"}.  She has concerns about domestic violence: {Responses; yes/NO/not asked:61843::\"no\"}.  OTHER THINGS SHE WANTS TO DISCUSS TODAY:  {Other complaints:04452::\"Nothing else\"}    The following portions of the patient's history were reviewed and updated as appropriate:{Misc - History " "reviewed:41565::\"problem list\",\"current medications\",\"allergies\",\"past family history\",\"past medical history\",\"past social history\",\"past surgical history\"}.    Social History    Tobacco Use      Smoking status: Never Smoker      Smokeless tobacco: Never Used    Review of Systems  Constitutional POS: {Constitutional (+) ROS:93440::\"nothing reported\"}    NEG: {Constitutional (-) ROS:58113::\"anorexia\",\"night sweats\"}   Genitourinary POS: { (+) ROS:48089::\"nothing reported\"}    NEG: { (-) ROS:49161::\"dysuria\",\"hematuria\"}      Gastointestinal POS: {GI (+) ROS:49359::\"nothing reported\"}    NEG: {GI (-) ROS:37079::\"bloating\",\"change in bowel habits\",\"melena\",\"reflux symptoms\"}   Integument POS: {Skin (+) ROS:80119::\"nothing reported\"}    NEG: {Skin (-) ROS:00812::\"moles that are changing in size, shape, color\",\"rashes\"}   Breast POS: {Breast (+) ROS:25106::\"nothing reported\"}    NEG: {Breast (-) ROS:21361::\"persistent breast lump\",\"skin dimpling\",\"nipple discharge\"}        Objective   /66   Resp 14   Wt 64.4 kg (142 lb)   LMP 05/26/2021 (Approximate)   Breastfeeding No   BMI 22.24 kg/m²     General:  {Exam - general findings:66678::\"well developed; well nourished\",\"no acute distress\"}   Skin:  {Exam - skin:79523::\"No suspicious lesions seen\"}   Thyroid: {Exam - thyroid:06324::\"normal to inspection and palpation\"}   Breasts:  {Exam - breasts:71149::\"Examined in supine position\",\"Symmetric without masses or skin dimpling\",\"Nipples normal without inversion, lesions or discharge\",\"There are no palpable axillary nodes\"}   Abdomen: {Exam - abdomen:75066::\"soft, non-tender; no masses\",\"no umbilical or inguinal hernias are present\",\"no hepato-splenomegaly\"}   Pelvis: {Exam; GYN pelvic:61411::\"Clinical staff was present for exam\"}        Assessment   1. ***  2. {Screening exam update:76504::\"She is up to date on all relevant gynecologic and colorectal screenings\"}     Plan   1. ***  2. I discussed with Daisy " "that she may be behind on needed vaccinations for {Vaccines to update:93362}.  She may be able to obtain these vaccinations at her local pharmacy OR speak about obtaining them with her primary care.  If she does obtain her vaccines, I have asked Daisy to let us know the date each vaccine was obtained so that her medical record could be updated in our system.  3. The importance of keeping all planned follow-up and taking all medications as prescribed was emphasized.  4. Follow up {F/U reason:28811::\"for annual exam\"} *** {follow-up time:22868}    No orders of the defined types were placed in this encounter.         This note was electronically signed.    Tutu Boyce M.D.  June 17, 2021    Note: Speech recognition transcription software may have been used to create portions of this document.  An attempt at proofreading has been made but errors in transcription could still be present.  "

## 2021-10-04 ENCOUNTER — OFFICE VISIT (OUTPATIENT)
Dept: FAMILY MEDICINE CLINIC | Facility: CLINIC | Age: 21
End: 2021-10-04

## 2021-10-04 VITALS
BODY MASS INDEX: 22.04 KG/M2 | DIASTOLIC BLOOD PRESSURE: 78 MMHG | HEART RATE: 95 BPM | OXYGEN SATURATION: 98 % | WEIGHT: 145.4 LBS | RESPIRATION RATE: 11 BRPM | SYSTOLIC BLOOD PRESSURE: 116 MMHG | HEIGHT: 68 IN | TEMPERATURE: 97 F

## 2021-10-04 DIAGNOSIS — Z00.00 HEALTH CARE MAINTENANCE: Primary | ICD-10-CM

## 2021-10-04 PROBLEM — Z01.419 WELL WOMAN EXAM: Status: RESOLVED | Noted: 2020-08-15 | Resolved: 2021-10-04

## 2021-10-04 LAB
ALBUMIN SERPL-MCNC: 4.5 G/DL (ref 3.5–5.2)
ALBUMIN/GLOB SERPL: 1.9 G/DL
ALP SERPL-CCNC: 43 U/L (ref 39–117)
ALT SERPL W P-5'-P-CCNC: 13 U/L (ref 1–33)
ANION GAP SERPL CALCULATED.3IONS-SCNC: 9.7 MMOL/L (ref 5–15)
AST SERPL-CCNC: 13 U/L (ref 1–32)
BASOPHILS # BLD AUTO: 0.03 10*3/MM3 (ref 0–0.2)
BASOPHILS NFR BLD AUTO: 0.5 % (ref 0–1.5)
BILIRUB SERPL-MCNC: 0.2 MG/DL (ref 0–1.2)
BUN SERPL-MCNC: 15 MG/DL (ref 6–20)
BUN/CREAT SERPL: 22.4 (ref 7–25)
CALCIUM SPEC-SCNC: 9.2 MG/DL (ref 8.6–10.5)
CHLORIDE SERPL-SCNC: 110 MMOL/L (ref 98–107)
CHOLEST SERPL-MCNC: 144 MG/DL (ref 0–200)
CO2 SERPL-SCNC: 22.3 MMOL/L (ref 22–29)
CREAT SERPL-MCNC: 0.67 MG/DL (ref 0.57–1)
DEPRECATED RDW RBC AUTO: 39.9 FL (ref 37–54)
EOSINOPHIL # BLD AUTO: 0.05 10*3/MM3 (ref 0–0.4)
EOSINOPHIL NFR BLD AUTO: 0.9 % (ref 0.3–6.2)
ERYTHROCYTE [DISTWIDTH] IN BLOOD BY AUTOMATED COUNT: 11.7 % (ref 12.3–15.4)
GFR SERPL CREATININE-BSD FRML MDRD: 111 ML/MIN/1.73
GLOBULIN UR ELPH-MCNC: 2.4 GM/DL
GLUCOSE SERPL-MCNC: 102 MG/DL (ref 65–99)
HCT VFR BLD AUTO: 40.7 % (ref 34–46.6)
HDLC SERPL-MCNC: 55 MG/DL (ref 40–60)
HGB BLD-MCNC: 13.1 G/DL (ref 12–15.9)
IMM GRANULOCYTES # BLD AUTO: 0.02 10*3/MM3 (ref 0–0.05)
IMM GRANULOCYTES NFR BLD AUTO: 0.4 % (ref 0–0.5)
IRON 24H UR-MRATE: 56 MCG/DL (ref 37–145)
LDLC SERPL CALC-MCNC: 82 MG/DL (ref 0–100)
LDLC/HDLC SERPL: 1.52 {RATIO}
LYMPHOCYTES # BLD AUTO: 1.56 10*3/MM3 (ref 0.7–3.1)
LYMPHOCYTES NFR BLD AUTO: 27.7 % (ref 19.6–45.3)
MCH RBC QN AUTO: 30 PG (ref 26.6–33)
MCHC RBC AUTO-ENTMCNC: 32.2 G/DL (ref 31.5–35.7)
MCV RBC AUTO: 93.1 FL (ref 79–97)
MONOCYTES # BLD AUTO: 0.71 10*3/MM3 (ref 0.1–0.9)
MONOCYTES NFR BLD AUTO: 12.6 % (ref 5–12)
NEUTROPHILS NFR BLD AUTO: 3.27 10*3/MM3 (ref 1.7–7)
NEUTROPHILS NFR BLD AUTO: 57.9 % (ref 42.7–76)
NRBC BLD AUTO-RTO: 0 /100 WBC (ref 0–0.2)
PLATELET # BLD AUTO: 253 10*3/MM3 (ref 140–450)
PMV BLD AUTO: 9.5 FL (ref 6–12)
POTASSIUM SERPL-SCNC: 5.2 MMOL/L (ref 3.5–5.2)
PROT SERPL-MCNC: 6.9 G/DL (ref 6–8.5)
RBC # BLD AUTO: 4.37 10*6/MM3 (ref 3.77–5.28)
SODIUM SERPL-SCNC: 142 MMOL/L (ref 136–145)
TRIGL SERPL-MCNC: 27 MG/DL (ref 0–150)
TSH SERPL DL<=0.05 MIU/L-ACNC: 1.87 UIU/ML (ref 0.27–4.2)
VLDLC SERPL-MCNC: 7 MG/DL (ref 5–40)
WBC # BLD AUTO: 5.64 10*3/MM3 (ref 3.4–10.8)

## 2021-10-04 PROCEDURE — 83540 ASSAY OF IRON: CPT | Performed by: FAMILY MEDICINE

## 2021-10-04 PROCEDURE — 80050 GENERAL HEALTH PANEL: CPT | Performed by: FAMILY MEDICINE

## 2021-10-04 PROCEDURE — 80061 LIPID PANEL: CPT | Performed by: FAMILY MEDICINE

## 2021-10-04 PROCEDURE — 99395 PREV VISIT EST AGE 18-39: CPT | Performed by: FAMILY MEDICINE

## 2021-10-04 NOTE — PROGRESS NOTES
"Subjective   Daisy Aguila is a 20 y.o. female and is here for a comprehensive physical exam. The patient reports see below.     Pt still has Kyleena IUD in place. States that her cramps have improved but she is still having bleeding. At times, bleeding for a month at a time. Following with OBGYN for this. Last TVUS WNL. Also c/o fatigue. Trying to only consume 2 cups coffee/day.     Previous back pain resolved with PT. XR lumbar spine unremarkable.     Last health maintenance visit was 1 year . Overall they feel their health is good . Lives with fiance  Occupation is student ([employer]), work at [employer]. Patient's diet is in general, a \"healthy\" diet  . Exercises regularly regularly 4-5x times weekly (walking) . Tobacco use Never used. Alcohol use is social drinker . Illicit drug no history of illicit drug use    Patient Care Team:  Mariangel Khan DO as PCP - General (Family Medicine)  Tutu Boyce MD as Obstetrician (Obstetrics and Gynecology)     Screening Tests  Vision Impairment. Uses Glasses/Contacts   Hearingnormal  Dental: Brushes does teeth twice a day . Dental exam every six months?no  Mammogram up to date n/a  Pap smear yes  Colonoscopy n/a  Dexa Scan n/a    Do you take any herbs or supplements that were not prescribed by a doctor? no  Are you taking calcium supplements? no  Are you taking aspirin daily? no  Family history of ovarian cancer? no  Family history of breast cancer? no  FH of endometrial cancer? no  FH of cervical cancer? no  FH of colon cancer? no       History:  LMP: Patient's last menstrual period was 2021.  Menopause at n/a years  Last pap date: 2021  Abnormal pap? yes  : 0  Para: 0    Immunization History  Tdap? yes  HPV? yes  Pneumonia? not applicable  Shingles? not applicable    The following portions of the patient's history were reviewed and updated as appropriate:   She  has no past medical history on file.  She does not have any pertinent " problems on file.  She  has a past surgical history that includes Dental surgery (2017).  Her family history includes ADD / ADHD in her father; Epilepsy in her mother.  She  reports that she has never smoked. She has never used smokeless tobacco. She reports that she does not drink alcohol and does not use drugs.  Current Outpatient Medications   Medication Sig Dispense Refill   • Levonorgestrel (KYLEENA IU) by Intrauterine route.       No current facility-administered medications for this visit.     Current Outpatient Medications on File Prior to Visit   Medication Sig   • Levonorgestrel (KYLEENA IU) by Intrauterine route.     No current facility-administered medications on file prior to visit.     She has No Known Allergies..      Current Outpatient Medications:   •  Levonorgestrel (KYLEENA IU), by Intrauterine route., Disp: , Rfl:     History reviewed. No pertinent past medical history.    Family History   Problem Relation Age of Onset   • Epilepsy Mother    • ADD / ADHD Father        Past Surgical History:   Procedure Laterality Date   • DENTAL PROCEDURE  2017    molar pulled       Social History     Socioeconomic History   • Marital status: Single     Spouse name: Not on file   • Number of children: Not on file   • Years of education: Not on file   • Highest education level: Not on file   Tobacco Use   • Smoking status: Never Smoker   • Smokeless tobacco: Never Used   Vaping Use   • Vaping Use: Never used   Substance and Sexual Activity   • Alcohol use: Never   • Drug use: Never   • Sexual activity: Defer       Review of Systems  Do you have pain that bothers you in your daily life? no  Review of Systems   Constitutional: Negative.  Negative for activity change, fatigue, fever and unexpected weight change.   HENT: Negative.  Negative for congestion, sneezing and sore throat.    Eyes: Negative.  Negative for visual disturbance.   Respiratory: Negative.  Negative for cough, chest tightness, shortness of breath  and wheezing.    Cardiovascular: Negative.  Negative for chest pain, palpitations and leg swelling.   Gastrointestinal: Negative.  Negative for abdominal distention, abdominal pain, blood in stool, constipation, diarrhea and nausea.   Endocrine: Negative.  Negative for cold intolerance and heat intolerance.   Genitourinary: Negative.  Negative for dysuria, frequency and urgency.   Musculoskeletal: Negative.  Negative for arthralgias and myalgias.   Skin: Negative.  Negative for rash.   Allergic/Immunologic: Negative.    Neurological: Negative.  Negative for dizziness, syncope and numbness.   Hematological: Negative.  Negative for adenopathy.   Psychiatric/Behavioral: Negative.  Negative for suicidal ideas. The patient is not nervous/anxious.        Objective   Physical Exam  Vitals and nursing note reviewed.   Constitutional:       General: She is not in acute distress.     Appearance: She is well-developed. She is not diaphoretic.   HENT:      Right Ear: External ear normal.      Left Ear: External ear normal.      Nose: Nose normal.   Eyes:      Conjunctiva/sclera: Conjunctivae normal.      Pupils: Pupils are equal, round, and reactive to light.   Neck:      Thyroid: No thyromegaly.   Cardiovascular:      Rate and Rhythm: Normal rate and regular rhythm.      Heart sounds: Normal heart sounds. No murmur heard.     Pulmonary:      Effort: Pulmonary effort is normal. No respiratory distress.      Breath sounds: Normal breath sounds. No wheezing.   Abdominal:      General: Bowel sounds are normal. There is no distension.      Palpations: Abdomen is soft. There is no mass.      Tenderness: There is no abdominal tenderness. There is no guarding or rebound.      Hernia: No hernia is present.   Musculoskeletal:         General: No tenderness. Normal range of motion.      Cervical back: Normal range of motion and neck supple.   Lymphadenopathy:      Cervical: No cervical adenopathy.   Skin:     General: Skin is warm and  "dry.      Coloration: Skin is not pale.      Findings: No erythema or rash.   Neurological:      Mental Status: She is alert and oriented to person, place, and time.      Deep Tendon Reflexes: Reflexes are normal and symmetric.   Psychiatric:         Behavior: Behavior normal.         Thought Content: Thought content normal.         Judgment: Judgment normal.       /78   Pulse 95   Temp 97 °F (36.1 °C)   Resp 11   Ht 172.7 cm (68\")   Wt 66 kg (145 lb 6.4 oz)   LMP 09/07/2021   SpO2 98%   BMI 22.11 kg/m²    Body mass index is 22.11 kg/m².      Diagnoses and all orders for this visit:    1. Health care maintenance (Primary)  -     CBC & Differential  -     Comprehensive Metabolic Panel  -     Iron  -     TSH  -     Lipid Panel          Patient Counseling:   --BMI: Discussed that it is acceptable and appropriate                 Plan.  --Nutrition: Stressed importance of moderation in sodium/caffeine intake, saturated fat and cholesterol, caloric balance, sufficient intake of fresh fruits, vegetables, fiber, calcium, iron, and 1 mg of folate supplement per day (for females capable of pregnancy).  ---Exercise: Stressed the importance of regular exercise.   --Substance Abuse: Discussed cessation/primary prevention of tobacco, alcohol, or other drug use; driving or other dangerous activities under the influence; availability of treatment for abuse.    --Sexuality: Discussed sexually transmitted diseases, partner selection, use of condoms, avoidance of unintended pregnancy  and contraceptive alternatives.   --Injury prevention: Discussed safety belts, safety helmets, smoke detector, smoking near bedding or upholstery.   --Dental health: Discussed importance of regular tooth brushing, flossing, and dental visits.  --Immunizations reviewed.  --Discussed benefits of mammogram  --Discussed benefits of screening colonoscopy.  --After hours service discussed with patient    Discussed the nature of the disease " including, risks, complications, implications, management, safe and proper use of medications. Encouraged therapeutic lifestyle changes including healthy diet with plenty of fruits and vegetables, daily exercise, medication compliance, and keeping scheduled follow up appointments with me and any other providers. Encouraged patient to have appointment for complete physical, fasting labs, appropriate screenings, and immunizations on an annual basis.       Follow up as needed for acute illness

## 2021-11-13 PROCEDURE — U0004 COV-19 TEST NON-CDC HGH THRU: HCPCS | Performed by: PERSONAL EMERGENCY RESPONSE ATTENDANT

## 2021-11-15 ENCOUNTER — TELEPHONE (OUTPATIENT)
Dept: URGENT CARE | Facility: CLINIC | Age: 21
End: 2021-11-15

## 2021-11-16 NOTE — TELEPHONE ENCOUNTER
----- Message from Romero Valerio MD sent at 11/15/2021  8:27 AM EST -----  Please inform patient of negative lab result    ----- Message -----  From: Lab, Background User  Sent: 11/14/2021   5:42 PM EST  To: [facility] [address] Covid Results

## 2021-12-14 PROCEDURE — U0004 COV-19 TEST NON-CDC HGH THRU: HCPCS | Performed by: NURSE PRACTITIONER

## 2022-03-17 ENCOUNTER — TELEPHONE (OUTPATIENT)
Dept: FAMILY MEDICINE CLINIC | Facility: CLINIC | Age: 22
End: 2022-03-17

## 2022-03-17 NOTE — TELEPHONE ENCOUNTER
Caller: Daisy Aguila    Relationship: Self    Best call back number: [phone]     What form or medical record are you requesting: PATIENT HAS A FORM TO BE SUB CERTIFIED THAT NEEDS TO BE FILLED OUT    Who is requesting this form or medical record from you: [facility]    How would you like to receive the form or medical records (pick-up, mail, fax): SHE WOULD LIKE TO EMAIL IT, IF POSSIBLE.    Timeframe paperwork needed: AS SOON AS POSSIBLE    PLEASE CALL AND ADVISE. SHE CAN BE REACHED AFTER 2:45.

## 2022-03-22 ENCOUNTER — OFFICE VISIT (OUTPATIENT)
Dept: OBSTETRICS AND GYNECOLOGY | Facility: CLINIC | Age: 22
End: 2022-03-22

## 2022-03-22 VITALS
SYSTOLIC BLOOD PRESSURE: 122 MMHG | BODY MASS INDEX: 22.52 KG/M2 | DIASTOLIC BLOOD PRESSURE: 80 MMHG | RESPIRATION RATE: 14 BRPM | WEIGHT: 147 LBS

## 2022-03-22 DIAGNOSIS — Z11.8 SCREENING FOR CHLAMYDIAL DISEASE: ICD-10-CM

## 2022-03-22 DIAGNOSIS — Z71.85 VACCINE COUNSELING: ICD-10-CM

## 2022-03-22 DIAGNOSIS — Z71.3 NUTRITIONAL COUNSELING: ICD-10-CM

## 2022-03-22 DIAGNOSIS — Z01.419 WELL WOMAN EXAM: Primary | ICD-10-CM

## 2022-03-22 PROCEDURE — 99395 PREV VISIT EST AGE 18-39: CPT | Performed by: OBSTETRICS & GYNECOLOGY

## 2022-03-22 NOTE — PATIENT INSTRUCTIONS
Calcium and Vitamin D Information for Bone Health    How much calcium do I need?    Optimal Daily        Age Intake (in mgs.)     14 - 18 1300      18 - 50 1000     Over 50 1200     Breast feeding 1000     How much Vitamin D do I need?        Age    International Units     < 70 600     > 70 800         * Serum vitamin D levels should be between 20 - 50 ng/ml.       * Levels > 50 ng/ml may have adverse effects.     Food   Amount Calcium (mgs.) Fat (gms.)   Milk and Dairy Products      Low-fat plain yogurt 1 cup 415 3.4   Skim Milk 1 cup 303 0.4   Low-fat milk 1 cup 298 4.7   Swiss cheese 1 oz. 219 7.1   Cheddar cheese 1 oz. 211 9.1   American Cheese 1 oz. 195 8.4   Ice cream (hard) 1 cup 176 14.1         Nuts      Sesame seeds, dried 3 ½  ozs. 100 53.4   Almonds 1 oz. 66 16.2         Seafood      Scallops, steamed 3 ½  ozs. 115 1.4   Shrimp, raw 3 ½  ozs. 63 0.8         Green Leafy vegetables      Kale, cooked without stem ¾ cup 187 0.7   Turnip greens, cooked ½ cup 184 0.2   Broccoli, cooked 2/3 cup 88 0.3   Spinach, cooked ½ cup 83 0.3         Other foods      Pudding, chocolate ½ cup 147 6.6   Slice from 12 inch cheese pizza 1 piece 144 4.4   Cream of celery soup made with milk 1 serving 135 33.0   Figs, dried 5 medium 126 1.3   Raisins, dried, seedless 5/8 cup 62 0.2   Chili con carne with beans 5 oz. 61 9.9     Is there a difference between calcium supplements?   Calcium carbonate is the best supplement to prevent bone loss.  To be absorbed maximally, it must be taken with a meal.   If you cannot take your calcium with a meal, calcium citrate is the best supplement

## 2022-03-22 NOTE — PROGRESS NOTES
Subjective   Chief Complaint   Patient presents with   • Gynecologic Exam     Daisy Aguila is a 21 y.o. year old  presenting to be seen for her annual exam.     SEXUAL Hx:  She is currently sexually active.  In the past year there there has been NO new sexual partners.    Condoms are used intermittently.  She would like to be screened for STD's at today's exam.  Current birth control method: condoms and IUD - Kyleena.  She is happy with her current method of contraception and does not want to discuss alternative methods of contraception.  MENSTRUAL Hx:  Patient's last menstrual period was 2022 (approximate).  In the past 6 months her cycles have been regular, predictable and occur monthly.  Her menstrual flow is typically normal.   Each month on average there are roughly 0 day(s) of very heavy flow.  Intermenstrual bleeding is absent.    Post-coital bleeding is absent.  Dysmenorrhea: is not affecting her activities of daily living  PMS: is not affecting her activities of daily living  Her cycles are not a source of concern for her that she wishes to discuss today.  HEALTH Hx:  She exercises regularly: yes.  She wears her seat belt: yes.  She has concerns about domestic violence: no.  OTHER THINGS SHE WANTS TO DISCUSS TODAY:  Nothing else    The following portions of the patient's history were reviewed and updated as appropriate:problem list, current medications, allergies, past family history, past medical history, past social history and past surgical history.    Social History    Tobacco Use      Smoking status: Never Smoker      Smokeless tobacco: Never Used    Review of Systems  Constitutional POS: nothing reported    NEG: anorexia or night sweats   Genitourinary POS: nothing reported    NEG: dysuria or hematuria      Gastointestinal POS: nothing reported    NEG: bloating, change in bowel habits, melena or reflux symptoms   Integument POS: nothing reported    NEG: moles that are changing  in size, shape, color or rashes   Breast POS: nothing reported    NEG: persistent breast lump, skin dimpling or nipple discharge        Objective   /80   Resp 14   Wt 66.7 kg (147 lb)   LMP 03/08/2022 (Approximate)   Breastfeeding No   BMI 22.52 kg/m²     General:  well developed; well nourished  no acute distress   Skin:  No suspicious lesions seen   Thyroid: normal to inspection and palpation   Breasts:  Not performed.   Abdomen: soft, non-tender; no masses  no umbilical or inguinal hernias are present  no hepato-splenomegaly   Pelvis: Clinical staff was present for exam  External genitalia:  normal appearance of the external genitalia including Bartholin's and Ashby's glands.  :  urethral meatus normal;  Vaginal:  normal pink mucosa without prolapse or lesions.  Cervix:  normal appearance. IUD string present - 1.5 cms in length;  Uterus:  normal size, shape and consistency.  Adnexa:  normal bimanual exam of the adnexa.        Assessment   1. Normal GYN exam  2. She is up to date on all relevant gynecologic and colorectal screenings     Plan   1. Pap was not done today.  I explained to Daisy that the recommendations for Pap smear interval in a low risk patient has lengthened to 3 years time.  I told Daisy she still needs to be seen in our office yearly for a full physical including breast and pelvic exam.  2. The following tests were ordered today: STD swabs for GC, chlamydia and trichimoniasis.  It was explained to Jorge Luisjohny that all lab test should be back within the one week after they are performed. She will be notified about the results, regardless of the findings. If she has not been contacted by the office within 2 weeks after the test has been performed, it is her responsibility to contact us to learn about her results.  3. Today I discussed with Daisy the total recommended calcium intake for a premenopausal female is 1000 mg.  Ideally this should be from dietary sources.  I reviewed  calcium content in various foods including milk, fortified orange juice and yogurt.  If she cannot get sufficient calcium through dietary means, it is recommended to supplement with either a multivitamin or calcium to reach her daily goal.  I also reviewed the difference in the bioavailability of calcium carbonate and calcium citrate containing supplements and the importance of taking calcium carbonate containing products with food.  Finally, vitamin D's role in calcium absorption was reviewed and a total daily vitamin D intake of 800 units was recommended.  4. Her vaccine record was reviewed and updated.  5. I discussed with Daisy that she may be behind on needed vaccinations for Influenza and COVID booster.  She may be able to obtain these vaccinations at her local pharmacy OR speak about obtaining them with her primary care.  If she does obtain her vaccines, I have asked Daisy to let us know the date each vaccine was obtained so that her medical record could be updated in our system.  6. The importance of keeping all planned follow-up and taking all medications as prescribed was emphasized.  7. Follow up for annual exam 1 year         This note was electronically signed.    Tutu Boyce M.D.  March 22, 2022    Part of this note may be an electronic transcription/translation of spoken language to printed text using the Dragon Dictation System.

## 2022-04-14 ENCOUNTER — OFFICE VISIT (OUTPATIENT)
Dept: FAMILY MEDICINE CLINIC | Facility: CLINIC | Age: 22
End: 2022-04-14

## 2022-04-14 VITALS
TEMPERATURE: 97.1 F | HEIGHT: 68 IN | SYSTOLIC BLOOD PRESSURE: 110 MMHG | DIASTOLIC BLOOD PRESSURE: 72 MMHG | RESPIRATION RATE: 16 BRPM | OXYGEN SATURATION: 100 % | WEIGHT: 145 LBS | BODY MASS INDEX: 21.98 KG/M2 | HEART RATE: 95 BPM

## 2022-04-14 DIAGNOSIS — Z00.00 ANNUAL PHYSICAL EXAM: Primary | ICD-10-CM

## 2022-04-14 PROCEDURE — 90686 IIV4 VACC NO PRSV 0.5 ML IM: CPT | Performed by: STUDENT IN AN ORGANIZED HEALTH CARE EDUCATION/TRAINING PROGRAM

## 2022-04-14 PROCEDURE — 2014F MENTAL STATUS ASSESS: CPT | Performed by: STUDENT IN AN ORGANIZED HEALTH CARE EDUCATION/TRAINING PROGRAM

## 2022-04-14 PROCEDURE — 3008F BODY MASS INDEX DOCD: CPT | Performed by: STUDENT IN AN ORGANIZED HEALTH CARE EDUCATION/TRAINING PROGRAM

## 2022-04-14 PROCEDURE — 90471 IMMUNIZATION ADMIN: CPT | Performed by: STUDENT IN AN ORGANIZED HEALTH CARE EDUCATION/TRAINING PROGRAM

## 2022-04-14 PROCEDURE — 99395 PREV VISIT EST AGE 18-39: CPT | Performed by: STUDENT IN AN ORGANIZED HEALTH CARE EDUCATION/TRAINING PROGRAM

## 2022-04-14 NOTE — ASSESSMENT & PLAN NOTE
Pap smear done with dr purcell. Last in march of 2021. She says was normal.   covid vaccine given  Flu vaccine:  Gardisil: has had  Eye exam she will schedule  Dental exam she will schedule.   Counseled on diet and exercise including limited sweets and sugars to focus on lean meat and vegetables. Counseled on 50 minutes of moderate exercise 3 times per week.

## 2022-04-14 NOTE — PROGRESS NOTES
New Patient Office Visit      Patient Name: Daisy Aguila  : 2000   MRN: [id number]     Chief Complaint:  Annual Exam (Work Physical)     History of Present Illness:     Pap smear done with dr purcell. Last in 2021. She says was normal.   covid vaccine given  Flu vaccine:  Gardisil: has had  Eye exam she will schedule  Dental exam she will schedule.   Counseled on diet and exercise including limited sweets and sugars to focus on lean meat and vegetables. Counseled on 50 minutes of moderate exercise 3 times per week.       Subjective        Past Medical History:   Diagnosis Date   • COVID 2022       Past Surgical History:   Procedure Laterality Date   • DENTAL PROCEDURE  2017    molar pulled       Family History   Problem Relation Age of Onset   • Epilepsy Mother    • ADD / ADHD Father        Social History     Socioeconomic History   • Marital status: Single   Tobacco Use   • Smoking status: Never Smoker   • Smokeless tobacco: Never Used   Vaping Use   • Vaping Use: Never used   Substance and Sexual Activity   • Alcohol use: Yes     Comment: rarely   • Drug use: Never   • Sexual activity: Yes     Partners: Male     Birth control/protection: Implant          Current Outpatient Medications:   •  amoxicillin (AMOXIL) 875 MG tablet, Take 1 tablet by mouth 2 (Two) Times a Day for 10 days., Disp: 20 tablet, Rfl: 0  •  brompheniramine-pseudoephedrine-DM 30-2-10 MG/5ML syrup, Take 5 mL by mouth 4 (Four) Times a Day As Needed for Allergies., Disp: 118 mL, Rfl: 0  •  Levonorgestrel (KYLEENA IU), by Intrauterine route., Disp: , Rfl:   •  fluticasone (FLONASE) 50 MCG/ACT nasal spray, 2 sprays into the nostril(s) as directed by provider Daily As Needed for Rhinitis or Allergies (Sinus congestion/pain) for up to 15 days., Disp: 9.9 mL, Rfl: 0    No Known Allergies    Objective     Physical Exam:  Vitals:    22 1555   BP: 110/72   Pulse: 95   Resp: 16   Temp: 97.1 °F (36.2 °C)   SpO2: 100%  "  Weight: 65.8 kg (145 lb)   Height: 172.7 cm (68\")      Body mass index is 22.05 kg/m².     Physical Exam  Constitutional:       General: She is not in acute distress.     Appearance: Normal appearance.   HENT:      Head: Normocephalic and atraumatic.   Eyes:      Extraocular Movements: Extraocular movements intact.   Cardiovascular:      Rate and Rhythm: Normal rate and regular rhythm.      Heart sounds: No murmur heard.  Pulmonary:      Effort: Pulmonary effort is normal. No respiratory distress.      Breath sounds: Normal breath sounds.   Abdominal:      General: Abdomen is flat.   Musculoskeletal:         General: No swelling.      Cervical back: Normal range of motion.   Skin:     Findings: No rash.   Neurological:      General: No focal deficit present.      Mental Status: She is alert.   Psychiatric:         Mood and Affect: Mood normal.              Assessment / Plan      Assessment/Plan:   Diagnoses and all orders for this visit:    1. Annual physical exam (Primary)  Assessment & Plan:  Pap smear done with dr purcell. Last in march of 2021. She says was normal.   covid vaccine given  Flu vaccine:  Gardisil: has had  Eye exam she will schedule  Dental exam she will schedule.   Counseled on diet and exercise including limited sweets and sugars to focus on lean meat and vegetables. Counseled on 50 minutes of moderate exercise 3 times per week.     Orders:  -     TB Skin Test    Other orders  -     FluLaval/Fluarix/Fluzone >6 Months       Return in about 1 year (around 4/14/2023).       Vangie Ayala D.O.  [facility] [place]     "

## 2022-04-18 ENCOUNTER — CLINICAL SUPPORT (OUTPATIENT)
Dept: FAMILY MEDICINE CLINIC | Facility: CLINIC | Age: 22
End: 2022-04-18

## 2022-04-18 DIAGNOSIS — Z00.00 ANNUAL PHYSICAL EXAM: Primary | ICD-10-CM

## 2022-04-18 PROCEDURE — 86580 TB INTRADERMAL TEST: CPT | Performed by: STUDENT IN AN ORGANIZED HEALTH CARE EDUCATION/TRAINING PROGRAM

## 2022-04-20 ENCOUNTER — CLINICAL SUPPORT (OUTPATIENT)
Dept: FAMILY MEDICINE CLINIC | Facility: CLINIC | Age: 22
End: 2022-04-20
Payer: MEDICAID

## 2022-04-20 LAB
INDURATION: 0 MM (ref 0–10)
Lab: NORMAL
TB SKIN TEST: NEGATIVE

## 2022-06-28 ENCOUNTER — OFFICE VISIT (OUTPATIENT)
Dept: FAMILY MEDICINE CLINIC | Facility: CLINIC | Age: 22
End: 2022-06-28

## 2022-06-28 VITALS
HEART RATE: 67 BPM | BODY MASS INDEX: 22.76 KG/M2 | SYSTOLIC BLOOD PRESSURE: 120 MMHG | OXYGEN SATURATION: 98 % | HEIGHT: 67 IN | DIASTOLIC BLOOD PRESSURE: 80 MMHG | TEMPERATURE: 98.6 F | RESPIRATION RATE: 18 BRPM | WEIGHT: 145 LBS

## 2022-06-28 DIAGNOSIS — F41.9 ANXIETY: Primary | ICD-10-CM

## 2022-06-28 PROCEDURE — 99214 OFFICE O/P EST MOD 30 MIN: CPT | Performed by: STUDENT IN AN ORGANIZED HEALTH CARE EDUCATION/TRAINING PROGRAM

## 2022-06-28 RX ORDER — SERTRALINE HYDROCHLORIDE 25 MG/1
25 TABLET, FILM COATED ORAL DAILY
Qty: 30 TABLET | Refills: 1 | Status: SHIPPED | OUTPATIENT
Start: 2022-06-28 | End: 2022-07-27 | Stop reason: SDUPTHER

## 2022-06-28 NOTE — PROGRESS NOTES
"Chief Complaint  Anxiety    History of Present Illness    Anxiety  She has been seeing a therapist for about 2.5 years and this has worked for a while but for the past 7 months this has not worked for her. She has good support with her friends and her  but This is affecting her life. She says she is overwhelmed by small things.   She denies any sadness. She is not suicidal or homicidal.   She worries about several things. It is hard to control.   She eats okay, doesn't sleep very well says she \"starts thinking about everything negative that happened that day\". She would like medication management.      The following portions of the patient's history were reviewed and updated as appropriate: allergies, current medications, past family history, past medical history, past social history, past surgical history, and problem list.    OBJECTIVE:  /80   Pulse 67   Temp 98.6 °F (37 °C)   Resp 18   Ht 170.2 cm (67\")   Wt 65.8 kg (145 lb)   SpO2 98%   BMI 22.71 kg/m²       Physical Exam  Constitutional:       General: She is not in acute distress.     Appearance: Normal appearance.   HENT:      Head: Normocephalic and atraumatic.   Eyes:      Extraocular Movements: Extraocular movements intact.   Cardiovascular:      Rate and Rhythm: Normal rate and regular rhythm.      Heart sounds: No murmur heard.  Pulmonary:      Effort: Pulmonary effort is normal. No respiratory distress.      Breath sounds: Normal breath sounds.   Abdominal:      General: Abdomen is flat.   Musculoskeletal:         General: No swelling.      Cervical back: Normal range of motion.   Skin:     Findings: No rash.   Neurological:      General: No focal deficit present.      Mental Status: She is alert.   Psychiatric:         Mood and Affect: Mood normal.                    Assessment and Plan   Diagnoses and all orders for this visit:    1. Anxiety (Primary)  Assessment & Plan:  Zoloft can be associated with a bleeding risk, risk of " fractures in those >50 years old, low sodium, glaucoma, sexual dysfunction, suicidal thoughts and worsening depression. It cannot be stopped abruptly. It should be reconsidered in pregnancy. She says she is not pregnant. The patient understands these risks.     She would also like genesight testing.       Other orders  -     sertraline (Zoloft) 25 MG tablet; Take 1 tablet by mouth Daily.  Dispense: 30 tablet; Refill: 1        Return in about 1 month (around 7/28/2022).       Vangie Ayala D.O.  [facility] [place] [facility]

## 2022-06-28 NOTE — ASSESSMENT & PLAN NOTE
Zoloft can be associated with a bleeding risk, risk of fractures in those >50 years old, low sodium, glaucoma, sexual dysfunction, suicidal thoughts and worsening depression. It cannot be stopped abruptly. It should be reconsidered in pregnancy. She says she is not pregnant. The patient understands these risks.     She would also like [employer] testing.

## 2022-07-27 ENCOUNTER — OFFICE VISIT (OUTPATIENT)
Dept: FAMILY MEDICINE CLINIC | Facility: CLINIC | Age: 22
End: 2022-07-27

## 2022-07-27 VITALS
DIASTOLIC BLOOD PRESSURE: 78 MMHG | OXYGEN SATURATION: 99 % | SYSTOLIC BLOOD PRESSURE: 116 MMHG | RESPIRATION RATE: 16 BRPM | BODY MASS INDEX: 22.76 KG/M2 | TEMPERATURE: 98.6 F | HEIGHT: 67 IN | HEART RATE: 74 BPM | WEIGHT: 145 LBS

## 2022-07-27 DIAGNOSIS — R45.4 IRRITABILITY: ICD-10-CM

## 2022-07-27 DIAGNOSIS — F41.9 ANXIETY: Primary | ICD-10-CM

## 2022-07-27 PROCEDURE — 99214 OFFICE O/P EST MOD 30 MIN: CPT | Performed by: STUDENT IN AN ORGANIZED HEALTH CARE EDUCATION/TRAINING PROGRAM

## 2022-07-27 RX ORDER — HYDROXYZINE PAMOATE 25 MG/1
25 CAPSULE ORAL 3 TIMES DAILY PRN
Qty: 30 CAPSULE | Refills: 0 | Status: SHIPPED | OUTPATIENT
Start: 2022-07-27

## 2022-07-27 NOTE — PROGRESS NOTES
"Chief Complaint  Anxiety (1 mth f/u)    History of Present Illness    Anxiety  We started on zoloft at her last appointment.  Her  says that she is not as anxious as she used to be. She feels like she does not know if this is true. She does admit to sleeping better. She had not had a panic attack since she had started the medication until Monday. She has noticed when she is angry she is more angry than she used to be. Her anxiety is more manageable.       The following portions of the patient's history were reviewed and updated as appropriate: allergies, current medications, past family history, past medical history, past social history, past surgical history, and problem list.    OBJECTIVE:  /78   Pulse 74   Temp 98.6 °F (37 °C)   Resp 16   Ht 170.2 cm (67\")   Wt 65.8 kg (145 lb)   SpO2 99%   BMI 22.71 kg/m²       Physical Exam  Constitutional:       General: She is not in acute distress.     Appearance: Normal appearance.   HENT:      Head: Normocephalic and atraumatic.   Eyes:      Extraocular Movements: Extraocular movements intact.   Cardiovascular:      Rate and Rhythm: Normal rate and regular rhythm.      Heart sounds: No murmur heard.  Pulmonary:      Effort: Pulmonary effort is normal. No respiratory distress.      Breath sounds: Normal breath sounds.   Abdominal:      General: Abdomen is flat.   Musculoskeletal:         General: No swelling.      Cervical back: Normal range of motion.   Skin:     Findings: No rash.   Neurological:      General: No focal deficit present.      Mental Status: She is alert.   Psychiatric:         Mood and Affect: Mood normal.                    Assessment and Plan   Diagnoses and all orders for this visit:    1. Anxiety (Primary)  Assessment & Plan:  Increase zoloft. Continue with counseling. Add vistaril for acute panic. Counseled on drowsiness with vistaril.      2. Irritability    Other orders  -     hydrOXYzine pamoate (Vistaril) 25 MG capsule; Take 1 " capsule by mouth 3 (Three) Times a Day As Needed for Anxiety.  Dispense: 30 capsule; Refill: 0  -     sertraline (Zoloft) 50 MG tablet; Take 0.5 tablets by mouth Daily.  Dispense: 30 tablet; Refill: 1        Return in about 1 month (around 8/27/2022).       Vangie Ayala D.O.  [facility] [place]

## 2022-07-27 NOTE — ASSESSMENT & PLAN NOTE
Increase zoloft. Continue with counseling. Add vistaril for acute panic. Counseled on drowsiness with vistaril.

## 2022-07-28 ENCOUNTER — TELEPHONE (OUTPATIENT)
Dept: OBSTETRICS AND GYNECOLOGY | Facility: CLINIC | Age: 22
End: 2022-07-28

## 2022-07-28 NOTE — TELEPHONE ENCOUNTER
ORIGINAL MESSAGE FORWARDED FROM     Appointment Request From: Daisy Aguila     With Provider: Tutu Boyce MD [[facility]]     Preferred Date Range: 7/28/2022 - 7/29/2022     Preferred Times: Any Time     Reason for visit: Follow-up     Comments:  Exam of bumps

## 2022-07-28 NOTE — TELEPHONE ENCOUNTER
Called attempting to reach patient; no answer, LVM requesting call back and provided office call back number.    Started this encounter as there was no answer from patient and notations not possible on appt request messages.

## 2022-07-29 ENCOUNTER — OFFICE VISIT (OUTPATIENT)
Dept: OBSTETRICS AND GYNECOLOGY | Facility: CLINIC | Age: 22
End: 2022-07-29

## 2022-07-29 VITALS
WEIGHT: 114 LBS | BODY MASS INDEX: 17.85 KG/M2 | RESPIRATION RATE: 16 BRPM | DIASTOLIC BLOOD PRESSURE: 70 MMHG | SYSTOLIC BLOOD PRESSURE: 116 MMHG

## 2022-07-29 DIAGNOSIS — L73.9 FOLLICULITIS: Primary | ICD-10-CM

## 2022-07-29 DIAGNOSIS — Z30.431 IUD CHECK UP: ICD-10-CM

## 2022-07-29 PROCEDURE — 99213 OFFICE O/P EST LOW 20 MIN: CPT | Performed by: NURSE PRACTITIONER

## 2022-07-29 RX ORDER — CEPHALEXIN 500 MG/1
500 CAPSULE ORAL 2 TIMES DAILY
Qty: 14 CAPSULE | Refills: 0 | Status: SHIPPED | OUTPATIENT
Start: 2022-07-29 | End: 2022-08-05

## 2022-07-29 NOTE — PROGRESS NOTES
"Problem Visit     Patient Name: Daisy Aguila  : 2000   MRN: [id number]   Care Team: Patient Care Team:  Vangie Ayala DO as PCP - General (Family Medicine)  Tutu Boyce MD as Obstetrician (Obstetrics and Gynecology)    Chief Complaint:    Chief Complaint   Patient presents with   • Mass     BUMPS VAG AREA AFTER WAXING       HPI: Daisy Aguila is a 21 y.o. year old  presenting to be seen with concerns of possible folliculitis.   Started waxing in May before her wedding   Had no problems then - waxing done again in  and then most recently 1 wk ago   Since then, she has developed several areas of \"bumps\" - one was expressible   They are uncomfortable, especially when she wears pads during her period   No vaginal c/o     Kyleena placed 10/2019 - doing well with method   AV done 2022 with Dr. 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      Objective     Physical Exam      Neuro: alert and oriented to person, place and time   General:  alert; cooperative; well developed; well nourished   Skin:  Not performed.   Thyroid: not examined   Lungs:  breathing is unlabored   Heart:  Not performed.   Breasts:  Not performed.   Abdomen: Not performed.   Pelvis: Clinical staff was present for exam  External genitalia:  multiple areas of folliculitis over labia and mons, all <1mm in size  :  urethral meatus normal;  Vaginal:  normal pink mucosa without prolapse or lesions. blood present -  small amount;  Cervix:  normal appearance. blood is seen coming from external cervical os; IUD string present - 2 cms in length;  Uterus:  normal size, shape and consistency.  Adnexa:  normal bimanual exam of the " adnexa.  Rectal:  digital rectal exam not performed; anus visually normal appearing.         Assessment / Plan      Assessment  Problems Addressed This Visit    ICD-10-CM ICD-9-CM   1. Folliculitis  L73.9 704.8   2. IUD check up  Z30.431 V25.42       Plan    Discussed folliculitis is common in this area - recommend no more waxing   No shaving while present, change razor   Keflex 500mg BID x 7 days given   Epsom salts baths, warm compresses   If folliculitis does not resolve after txment, she will let me know     Doing well with Kyleena     AV due March 2023           Follow Up  Return if symptoms worsen or fail to improve.  Patient was given instructions and counseling regarding her condition or for health maintenance advice. Please see specific information pulled into the AVS if appropriate.     Valeri Hendrickson, APRN  July 29, 2022  10:12 EDT

## 2022-08-25 ENCOUNTER — OFFICE VISIT (OUTPATIENT)
Dept: FAMILY MEDICINE CLINIC | Facility: CLINIC | Age: 22
End: 2022-08-25

## 2022-08-25 VITALS
RESPIRATION RATE: 17 BRPM | SYSTOLIC BLOOD PRESSURE: 116 MMHG | HEART RATE: 80 BPM | BODY MASS INDEX: 23.2 KG/M2 | DIASTOLIC BLOOD PRESSURE: 64 MMHG | TEMPERATURE: 98.4 F | WEIGHT: 147.8 LBS | HEIGHT: 67 IN | OXYGEN SATURATION: 100 %

## 2022-08-25 DIAGNOSIS — F41.9 ANXIETY: Primary | ICD-10-CM

## 2022-08-25 PROCEDURE — 99213 OFFICE O/P EST LOW 20 MIN: CPT | Performed by: STUDENT IN AN ORGANIZED HEALTH CARE EDUCATION/TRAINING PROGRAM

## 2022-08-25 NOTE — PROGRESS NOTES
"Chief Complaint  Follow-up (1 month f/u for anxiety. )    History of Present Illness    Anxiety  She feels her anxiety has improved. She still has some irritability but that is easier to control she feels. She does not feel she has been as mad lately.  states she does not get irritated as easily.   She has not had to take vistaril once. She has been sleeping better.       The following portions of the patient's history were reviewed and updated as appropriate: allergies, current medications, past family history, past medical history, past social history, past surgical history, and problem list.    OBJECTIVE:  /64   Pulse 80   Temp 98.4 °F (36.9 °C) (Temporal)   Resp 17   Ht 170.2 cm (67.01\")   Wt 67 kg (147 lb 12.8 oz)   SpO2 100%   BMI 23.14 kg/m²       Physical Exam  Constitutional:       General: She is not in acute distress.     Appearance: Normal appearance.   HENT:      Head: Normocephalic and atraumatic.   Eyes:      Extraocular Movements: Extraocular movements intact.   Cardiovascular:      Rate and Rhythm: Normal rate and regular rhythm.      Heart sounds: No murmur heard.  Pulmonary:      Effort: Pulmonary effort is normal. No respiratory distress.      Breath sounds: Normal breath sounds.   Abdominal:      General: Abdomen is flat.   Musculoskeletal:         General: No swelling.      Cervical back: Normal range of motion.   Skin:     Findings: No rash.   Neurological:      General: No focal deficit present.      Mental Status: She is alert.   Psychiatric:         Mood and Affect: Mood normal.                    Assessment and Plan   Diagnoses and all orders for this visit:    1. Anxiety (Primary)      Continue SSRI. genesight testing given to patient.       Return in about 3 months (around 11/25/2022).       Vangie Ayala D.O.  [facility] [place]     "

## 2022-10-13 ENCOUNTER — OFFICE VISIT (OUTPATIENT)
Dept: FAMILY MEDICINE CLINIC | Facility: CLINIC | Age: 22
End: 2022-10-13

## 2022-10-13 VITALS
HEIGHT: 68 IN | DIASTOLIC BLOOD PRESSURE: 86 MMHG | WEIGHT: 150.8 LBS | HEART RATE: 101 BPM | BODY MASS INDEX: 22.85 KG/M2 | OXYGEN SATURATION: 97 % | SYSTOLIC BLOOD PRESSURE: 117 MMHG | RESPIRATION RATE: 16 BRPM | TEMPERATURE: 98.6 F

## 2022-10-13 DIAGNOSIS — G43.809 OTHER MIGRAINE WITHOUT STATUS MIGRAINOSUS, NOT INTRACTABLE: Primary | ICD-10-CM

## 2022-10-13 PROBLEM — G43.909 MIGRAINES: Status: ACTIVE | Noted: 2022-10-13

## 2022-10-13 PROCEDURE — 99214 OFFICE O/P EST MOD 30 MIN: CPT | Performed by: STUDENT IN AN ORGANIZED HEALTH CARE EDUCATION/TRAINING PROGRAM

## 2022-10-13 RX ORDER — PROPRANOLOL HCL 60 MG
60 CAPSULE, EXTENDED RELEASE 24HR ORAL DAILY
Qty: 30 CAPSULE | Refills: 1 | Status: SHIPPED | OUTPATIENT
Start: 2022-10-13 | End: 2022-12-12

## 2022-10-13 NOTE — PROGRESS NOTES
"Chief Complaint  Headache (Migraines )    History of Present Illness     She has had migraines since childhood starting in 4th grade. She was on propranolol for this but stopped it years ago. She says past 4-5 months she has had recurrence of migraines. Headaches are asso with nausea and vision changes and better with dark room and sleep. She tolerated this as a child.    The following portions of the patient's history were reviewed and updated as appropriate: allergies, current medications, past family history, past medical history, past social history, past surgical history, and problem list.    OBJECTIVE:  /86   Pulse 101   Temp 98.6 °F (37 °C)   Resp 16   Ht 172.7 cm (68\")   Wt 68.4 kg (150 lb 12.8 oz)   SpO2 97%   BMI 22.93 kg/m²       Physical Exam  Constitutional:       General: She is not in acute distress.     Appearance: Normal appearance.   HENT:      Head: Normocephalic and atraumatic.   Eyes:      Extraocular Movements: Extraocular movements intact.   Cardiovascular:      Rate and Rhythm: Normal rate and regular rhythm.      Heart sounds: No murmur heard.  Pulmonary:      Effort: Pulmonary effort is normal. No respiratory distress.      Breath sounds: Normal breath sounds. No stridor. No wheezing, rhonchi or rales.   Skin:     Findings: No rash.   Neurological:      General: No focal deficit present.      Mental Status: She is alert.   Psychiatric:         Mood and Affect: Mood normal.                    Assessment and Plan   Diagnoses and all orders for this visit:    1. Other migraine without status migrainosus, not intractable (Primary)  Assessment & Plan:  Start on propranolol. Counseled to check bp if any dizziness as it can cause low bp.        Other orders  -     propranolol LA (Inderal LA) 60 MG 24 hr capsule; Take 1 capsule by mouth Daily.  Dispense: 30 capsule; Refill: 1    Exacerbation ofmigraines  We discussed possible triptan for abortive tx, she will consider.     Return in " about 1 month (around 11/13/2022).       Vangie Ayala D.O.  [facility] [place]

## 2022-10-17 PROCEDURE — U0004 COV-19 TEST NON-CDC HGH THRU: HCPCS | Performed by: NURSE PRACTITIONER

## 2022-10-17 PROCEDURE — 87205 SMEAR GRAM STAIN: CPT | Performed by: NURSE PRACTITIONER

## 2022-10-17 PROCEDURE — 87070 CULTURE OTHR SPECIMN AEROBIC: CPT | Performed by: NURSE PRACTITIONER

## 2022-12-12 RX ORDER — PROPRANOLOL HCL 60 MG
60 CAPSULE, EXTENDED RELEASE 24HR ORAL DAILY
Qty: 30 CAPSULE | Refills: 1 | Status: SHIPPED | OUTPATIENT
Start: 2022-12-12 | End: 2023-02-09

## 2022-12-28 ENCOUNTER — OFFICE VISIT (OUTPATIENT)
Dept: FAMILY MEDICINE CLINIC | Facility: CLINIC | Age: 22
End: 2022-12-28

## 2022-12-28 VITALS
BODY MASS INDEX: 24.49 KG/M2 | OXYGEN SATURATION: 97 % | WEIGHT: 161.6 LBS | SYSTOLIC BLOOD PRESSURE: 114 MMHG | DIASTOLIC BLOOD PRESSURE: 72 MMHG | RESPIRATION RATE: 22 BRPM | HEIGHT: 68 IN | TEMPERATURE: 98.6 F | HEART RATE: 103 BPM

## 2022-12-28 DIAGNOSIS — M25.511 ACUTE PAIN OF RIGHT SHOULDER: ICD-10-CM

## 2022-12-28 DIAGNOSIS — M54.50 LOW BACK PAIN WITHOUT SCIATICA, UNSPECIFIED BACK PAIN LATERALITY, UNSPECIFIED CHRONICITY: ICD-10-CM

## 2022-12-28 DIAGNOSIS — Z23 IMMUNIZATION DUE: Primary | ICD-10-CM

## 2022-12-28 DIAGNOSIS — M54.2 NECK PAIN: ICD-10-CM

## 2022-12-28 PROCEDURE — 90471 IMMUNIZATION ADMIN: CPT | Performed by: STUDENT IN AN ORGANIZED HEALTH CARE EDUCATION/TRAINING PROGRAM

## 2022-12-28 PROCEDURE — 99214 OFFICE O/P EST MOD 30 MIN: CPT | Performed by: STUDENT IN AN ORGANIZED HEALTH CARE EDUCATION/TRAINING PROGRAM

## 2022-12-28 PROCEDURE — 0124A COVID-19 (PFIZER) BIVALENT BOOSTER 12+YRS: CPT | Performed by: STUDENT IN AN ORGANIZED HEALTH CARE EDUCATION/TRAINING PROGRAM

## 2022-12-28 PROCEDURE — 91312 COVID-19 (PFIZER) BIVALENT BOOSTER 12+YRS: CPT | Performed by: STUDENT IN AN ORGANIZED HEALTH CARE EDUCATION/TRAINING PROGRAM

## 2022-12-28 PROCEDURE — 93000 ELECTROCARDIOGRAM COMPLETE: CPT | Performed by: STUDENT IN AN ORGANIZED HEALTH CARE EDUCATION/TRAINING PROGRAM

## 2022-12-28 PROCEDURE — 90686 IIV4 VACC NO PRSV 0.5 ML IM: CPT | Performed by: STUDENT IN AN ORGANIZED HEALTH CARE EDUCATION/TRAINING PROGRAM

## 2022-12-28 NOTE — PROGRESS NOTES
"Chief Complaint  Med Refill (Follow up )    History of Present Illness      Migraines  Started on propranolol at last visit.   She says it is doing \"really good\" and she hasn't had a headache in \"a while\".       She says she has some neck and back pain in the low back. She feels this is from \"weak muscles\". This low back pain has been going on two days.  This has been going on for about a week. No injury or overuse. She has had the low back pain before but has not had the upper neck pain before. She is able to ambulate. No dysuria. She would like her heart checked because of the neck pain. No radiation of pain. No numbness tingling burning.       The following portions of the patient's history were reviewed and updated as appropriate: allergies, current medications, past family history, past medical history, past social history, past surgical history, and problem list.    OBJECTIVE:  /72   Pulse 103   Temp 98.6 °F (37 °C)   Resp 22   Ht 172.7 cm (68\")   Wt 73.3 kg (161 lb 9.6 oz)   SpO2 97%   BMI 24.57 kg/m²       Physical Exam  Constitutional:       General: She is not in acute distress.     Appearance: Normal appearance.   HENT:      Head: Normocephalic and atraumatic.   Eyes:      Extraocular Movements: Extraocular movements intact.   Cardiovascular:      Rate and Rhythm: Normal rate and regular rhythm.      Heart sounds: No murmur heard.  Pulmonary:      Effort: Pulmonary effort is normal. No respiratory distress.      Breath sounds: Normal breath sounds. No stridor. No wheezing, rhonchi or rales.   Musculoskeletal:      Comments: Mild pain to palpation of right lumbar paraspinal muscles  No pain on palpation of cervical spine or right shoulder  The pain she points to is trap muscle on the right side.    Skin:     Findings: No rash.   Neurological:      General: No focal deficit present.      Mental Status: She is alert.   Psychiatric:         Mood and Affect: Mood normal.              ECG 12 " Lead    Date/Time: 12/28/2022 10:06 AM  Performed by: Vangie Ayala DO  Authorized by: Vangie Ayala DO   Rhythm: sinus rhythm  Rate: normal  Conduction: conduction normal  ST Segments: ST segments normal  QRS axis: normal    Clinical impression: non-specific ECG           No comparable ekg data present    Assessment and Plan   Diagnoses and all orders for this visit:    1. Immunization due (Primary)  -     COVID-19 Bivalent Booster (Pfizer) 12+yrs  -     FluLaval/Fluzone >6 mos ([id number])    2. Acute pain of right shoulder  -     ECG 12 Lead    3. Low back pain without sciatica, unspecified back pain laterality, unspecified chronicity  -     XR Spine Lumbar 4+ View; Future  -     Ambulatory Referral to Physical Therapy Evaluate and treat    4. Neck pain  -     XR Spine Cervical Complete 4 or 5 View; Future  -     Ambulatory Referral to Physical Therapy Evaluate and treat      For neck and low back pain will obtain xrays and refer to pt. Recommend rest ice and aleve for two weeks.   Follow up sooner if any new symptoms.     Return in about 2 months (around 2/28/2023).       Vangie Ayala D.O.  [facility] [place]

## 2023-01-09 ENCOUNTER — TREATMENT (OUTPATIENT)
Dept: PHYSICAL THERAPY | Facility: CLINIC | Age: 23
End: 2023-01-09
Payer: COMMERCIAL

## 2023-01-09 DIAGNOSIS — M54.50 CHRONIC BILATERAL LOW BACK PAIN WITHOUT SCIATICA: ICD-10-CM

## 2023-01-09 DIAGNOSIS — M54.2 PAIN, NECK: Primary | ICD-10-CM

## 2023-01-09 DIAGNOSIS — G89.29 CHRONIC BILATERAL LOW BACK PAIN WITHOUT SCIATICA: ICD-10-CM

## 2023-01-09 PROCEDURE — 97161 PT EVAL LOW COMPLEX 20 MIN: CPT | Performed by: PHYSICAL THERAPIST

## 2023-01-09 PROCEDURE — 97140 MANUAL THERAPY 1/> REGIONS: CPT | Performed by: PHYSICAL THERAPIST

## 2023-01-09 PROCEDURE — 97110 THERAPEUTIC EXERCISES: CPT | Performed by: PHYSICAL THERAPIST

## 2023-01-09 NOTE — PROGRESS NOTES
Physical Therapy Initial Evaluation and Plan of Care    [address], [address]  [place] [address]    Patient: Daisy Barton   : 2000  Diagnosis/ICD-10 Code:  Pain, neck [M54.2]  Referring practitioner: Vangie Ayala DO  Date of Initial Visit: 2023  Today's Date: 2023  Patient seen for 1 sessions           Subjective Questionnaire: Oswestry:  and NDI:       Subjective Evaluation    History of Present Illness  Mechanism of injury: Low back pain radiating into thighs, neck pain and headaches. Patient has experienced recurrent low back symptoms, mostly following an instance of forward bending. Most recent incident occurred about 20 days ago when she was putting on her shoes. Her neck pain started soon after her low back started to hurt.    Radiography revealed mild degenerative changes in cervical spine and DDD in lumbar spine.    CLOF: turns body to check L blind spot, pain sitting after 30 min, headaches occurring 2x/week, unable to lift heavier items off the floor    Medical history: chronic migraines      Patient Occupation:  Pain  Pain scale: neck: 4, low back: 3.  Pain scale at lowest: neck: 2, low back: 2.  Pain scale at highest: neck: 5, low back: 7.  Alleviating factors: heat.  Exacerbated by: low back: sitting (increases LE and back pain), walking >3 hrs; neck: reading, turning her head L.  Progression: improved    Patient Goals  Patient goals for therapy: decreased pain and increased motion             Objective          Postural Observations    Additional Postural Observation Details  Standing: unremarkable    Palpation   Left   Hypertonic in the iliopsoas, levator scapulae, quadratus lumborum, suboccipitals and upper trapezius.   Tenderness of the iliopsoas, levator scapulae, quadratus lumborum, suboccipitals and upper trapezius.     Right   Hypertonic in the iliopsoas, levator scapulae, quadratus lumborum, suboccipitals and upper trapezius. Tenderness  of the iliopsoas, levator scapulae, quadratus lumborum, suboccipitals and upper trapezius.     Cervical Spine Comments  Left suboccipitals: headache. Right suboccipitals: headache.     Left Shoulder Comments  Left levator scapulae: headache.     Right Shoulder Comments  Right levator scapulae: headache. Right upper trapezius: headache.     Additional Palpation Details  Headache provoked with joint mobility testing to all upper cervical segments bilaterally  Joint mobility: pain at L5/S1 with relative hypomobility, hypermobile all other lumbar segments and low-mid thoracic    Active Range of Motion   Cervical/Thoracic Spine   Cervical    Flexion: 30 degrees with pain  Extension: 45 degrees   Left lateral flexion: 25 (R shoulder pain) degrees   Right lateral flexion: 30 (R shoulder pain) degrees   Left rotation: 55 (R shoulder pain) degrees   Right rotation: 65 degrees     Additional Active Range of Motion Details  Flexion: touches ankles, low back pain, curve reversal present  Extension: WNL, upper lumbar hinge, low back pain  R SB: WNL  L SB: WNL    Strength/Myotome Testing     Left Hip   Planes of Motion   Extension: 4  Abduction: 4+    Right Hip   Planes of Motion   Extension: 4+  Abduction: 4    Muscle Activation     Additional Muscle Activation Details  Fair L gluteal and R multifidus activation during prone hip extension    Tests   Cervical   Deep neck flexor endurance: 2 (lost DNF, headache) seconds    Additional Tests Details  Compression increases axial neck pain, distraction relieves symptoms    Ambulation     Comments   Reduced arm swing and trunk rotation          Assessment & Plan     Assessment  Impairments: abnormal gait, abnormal muscle firing, abnormal or restricted ROM, activity intolerance, impaired physical strength, lacks appropriate home exercise program and pain with function  Functional Limitations: lifting, sleeping, walking, uncomfortable because of pain and sitting  Assessment details:  Patient presents with recurrent low back pain R>L and acute neck pain, which has exacerbated chronic migraines.    - Low back pain is consistent with discogenic pain, which recurrently injures during flexion-based activities that require eccentric control by trunk extensors. Multifidus activation was noted to be impaired and most lumbar segments are hypermobile.  - Neck pain- significant intrinsic neck weakness and guarding of global neck muscle were noted. Palpation of these muscles and mobility testing of all upper cervical facets referred into a headache R>L. Though she has managed her migraines via medication, today's examination suggests a possible cervicogenic origin, or at least significant component, of her headaches.     Barriers to therapy: recurrent nature of her low back pain  Prognosis: good    Goals  Plan Goals: Short Term Goals 4 weeks  1. Patient to be compliant with initial HEP  2. Patient to demonstrate pain free cervical ROM.  3. Patient to improve NDI score to < 7/50  4. Patient to improve ASIA score to < 5/50    Long Term Goals 8 weeks  1. Patient to be independent with HEP.  2. Patient to report reduced HA frequency to <1x/week  3. Patient to check L blind spot without needing to turn entire body.  4. Patient to demonstrate neck flexor endurance of > 20 sec.  5. Patient to improve NDI score to < 4/50  6. Patient to improve ASIA score to < 2/50  7. Sitting to tolerance to improve to >2 hrs.    Plan  Therapy options: will be seen for skilled therapy services  Planned modality interventions: cryotherapy, thermotherapy (hydrocollator packs), dry needling, electrical stimulation/Russian stimulation and TENS  Planned therapy interventions: manual therapy, motor coordination training, neuromuscular re-education, postural training, soft tissue mobilization, spinal/joint mobilization, strengthening, joint mobilization, home exercise program, functional ROM exercises, abdominal trunk stabilization and ADL  retraining  Frequency: 2x week  Duration in weeks: 6  Treatment plan discussed with: patient  Plan details: 2x/week for 6 weeks        Timed:  Manual Therapy:    8     mins  38733;  Therapeutic Exercise:    15     mins  62090;     Neuromuscular Dylan:    0    mins  20429;    Therapeutic Activity:     0     mins  74439;     Gait Trainin     mins  02158;     Ultrasound:     0     mins  08513;    Electrical Stimulation:    0     mins  84550 ( );    Untimed:  Electrical Stimulation:    0     mins  46520 ( );  Mechanical Traction:    0     mins  02925;     Timed Treatment:   23   mins   Total Treatment:     54   mins    PT SIGNATURE: Mario Amos PT   License Number: [id number]  DATE TREATMENT INITIATED: 2023    Initial Certification  Certification Period: 2023  I certify that the therapy services are furnished while this patient is under my care.  The services outlined above are required by this patient, and will be reviewed every 90 days.     PHYSICIAN: Vangie Ayala DO      DATE:     Please sign and return via fax to [phone].. Thank you, [facility].

## 2023-01-11 ENCOUNTER — TREATMENT (OUTPATIENT)
Dept: PHYSICAL THERAPY | Facility: CLINIC | Age: 23
End: 2023-01-11
Payer: COMMERCIAL

## 2023-01-11 DIAGNOSIS — G89.29 CHRONIC BILATERAL LOW BACK PAIN WITHOUT SCIATICA: ICD-10-CM

## 2023-01-11 DIAGNOSIS — M54.2 PAIN, NECK: Primary | ICD-10-CM

## 2023-01-11 DIAGNOSIS — M54.50 CHRONIC BILATERAL LOW BACK PAIN WITHOUT SCIATICA: ICD-10-CM

## 2023-01-11 PROCEDURE — 97140 MANUAL THERAPY 1/> REGIONS: CPT | Performed by: PHYSICAL THERAPIST

## 2023-01-11 PROCEDURE — 97112 NEUROMUSCULAR REEDUCATION: CPT | Performed by: PHYSICAL THERAPIST

## 2023-01-11 PROCEDURE — 97110 THERAPEUTIC EXERCISES: CPT | Performed by: PHYSICAL THERAPIST

## 2023-01-11 NOTE — PROGRESS NOTES
Physical Therapy Daily Progress Note    [address]  [place] [address]      Patient: Daisy Barton   : 2000  Diagnosis/ICD-10 Code:  Pain, neck [M54.2]  Referring practitioner: Vangie Ayala DO  Date of Initial Visit: Type: THERAPY  Noted: 2023  Today's Date: 2023  Patient seen for 2 sessions           Patient reports: HEP for her low back has felt good but the neck exercise was painful in the back of her neck when she would complete it. Current headache 4/10 which she attributes to stress instead of her neck. No current low back pain.       Objective   See Exercise, Manual, and Modality Logs for complete treatment.       Assessment/Plan  Patient reported headache with palpation of R upper trap and mobility testing of R C1/2. Initial increase in headache reported after mobilization of each of these, but after a few minutes she reported relief of headache following STM. Improved pain-free neck AROM following mobs to R C1/2, though headache did not resolve as much. Progressed lumbar stability and thoracic mobility exercises with good tolerance.            Timed:  Manual Therapy:    23     mins  85203;  Therapeutic Exercise:    10     mins  42445;     Neuromuscular Dylan:   13    mins  71703;    Therapeutic Activity:     0     mins  59053;     Gait Trainin     mins  15308;     Ultrasound:     0     mins  20837;    Electrical Stimulation:    0     mins  57191 ( );    Untimed:  Electrical Stimulation:    0     mins  32164 ( );  Mechanical Traction:    0     mins  69173;     Timed Treatment:   46   mins   Total Treatment:     46   mins    Mario Amos PT  Physical Therapist

## 2023-01-23 ENCOUNTER — TREATMENT (OUTPATIENT)
Dept: PHYSICAL THERAPY | Facility: CLINIC | Age: 23
End: 2023-01-23
Payer: COMMERCIAL

## 2023-01-23 DIAGNOSIS — G89.29 CHRONIC BILATERAL LOW BACK PAIN WITHOUT SCIATICA: ICD-10-CM

## 2023-01-23 DIAGNOSIS — M54.50 CHRONIC BILATERAL LOW BACK PAIN WITHOUT SCIATICA: ICD-10-CM

## 2023-01-23 DIAGNOSIS — M54.2 PAIN, NECK: Primary | ICD-10-CM

## 2023-01-23 PROCEDURE — 97112 NEUROMUSCULAR REEDUCATION: CPT | Performed by: PHYSICAL THERAPIST

## 2023-01-23 PROCEDURE — 97140 MANUAL THERAPY 1/> REGIONS: CPT | Performed by: PHYSICAL THERAPIST

## 2023-01-23 PROCEDURE — 97110 THERAPEUTIC EXERCISES: CPT | Performed by: PHYSICAL THERAPIST

## 2023-01-23 NOTE — PROGRESS NOTES
Physical Therapy Daily Progress Note    [address]  [place] [address]      Patient: Daisy Barton   : 2000  Diagnosis/ICD-10 Code:  Pain, neck [M54.2]  Referring practitioner: Vangie Ayala DO  Date of Initial Visit: Type: THERAPY  Noted: 2023  Today's Date: 2023  Patient seen for 3 sessions           Patient reports: her low back has been hurting since flying and she had not experienced a headache since last visit until today, when she worked again. Current headache is 5-6/10. Most of her low back pain has been L-sided, worse sitting.      Objective   See Exercise, Manual, and Modality Logs for complete treatment.       Assessment/Plan  Significant improvement in pain-free cervical AROM following manual interventions. She continues to respond unusually to upper cervical joint mobs, with increased baseline headache though improved mobility, but she reports continued symptom relief following STM. Reviewed HEP to ensure correct form. She struggles to stabilize her spine during prone hip ext, so these were transitioned to upright for now as this reduced symptoms. Added seated cervical retractions to counteract prolonged periods of forward bending during the day at work.            Timed:  Manual Therapy:    23     mins  77867;  Therapeutic Exercise:    13     mins  53513;     Neuromuscular Dylan:   10    mins  78677;    Therapeutic Activity:     0     mins  10739;     Gait Trainin     mins  55197;     Ultrasound:     0     mins  32654;    Electrical Stimulation:    0     mins  92671 ( );    Untimed:  Electrical Stimulation:    0     mins  45483 ( );  Mechanical Traction:    0     mins  38267;     Timed Treatment:   46   mins   Total Treatment:     46   mins    Mario Amos PT  Physical Therapist

## 2023-01-26 ENCOUNTER — TREATMENT (OUTPATIENT)
Dept: PHYSICAL THERAPY | Facility: CLINIC | Age: 23
End: 2023-01-26
Payer: COMMERCIAL

## 2023-01-26 DIAGNOSIS — M54.50 CHRONIC BILATERAL LOW BACK PAIN WITHOUT SCIATICA: ICD-10-CM

## 2023-01-26 DIAGNOSIS — G89.29 CHRONIC BILATERAL LOW BACK PAIN WITHOUT SCIATICA: ICD-10-CM

## 2023-01-26 DIAGNOSIS — M54.2 PAIN, NECK: Primary | ICD-10-CM

## 2023-01-26 PROCEDURE — 97140 MANUAL THERAPY 1/> REGIONS: CPT | Performed by: PHYSICAL THERAPIST

## 2023-01-26 PROCEDURE — 97112 NEUROMUSCULAR REEDUCATION: CPT | Performed by: PHYSICAL THERAPIST

## 2023-01-26 PROCEDURE — 97110 THERAPEUTIC EXERCISES: CPT | Performed by: PHYSICAL THERAPIST

## 2023-01-26 NOTE — PROGRESS NOTES
"Physical Therapy Daily Progress Note    [address] [facility], [address]  [place] [address]      Patient: Daisy Barton   : 2000  Diagnosis/ICD-10 Code:  Pain, neck [M54.2]  Referring practitioner: Vangie Ayala DO  Date of Initial Visit: Type: THERAPY  Noted: 2023  Today's Date: 2023  Patient seen for 4 sessions           Patient reports: her low back has been sore but her neck has felt much better since last visit. She reports mild \"sinus headache\" yesterday. Overall, neck and low back pain are improved.      Objective   See Exercise, Manual, and Modality Logs for complete treatment.       Assessment/Plan  Continued joint mobs to B OA and 1st ribs. STM to cervical mm continues to reduce any residual c/o headache after joint mobs. Improved lumbar AROM after STM to B QL. Patient able to tolerate prone hip ext without pain and progress rotational stability during bridging, as well. Also progressed DNF strengthening. She was able to correctly pattern these challenging stability exercises with fatigue but without c/o pain.            Timed:  Manual Therapy:    30     mins  08515;  Therapeutic Exercise:    10     mins  03304;     Neuromuscular Dylan:   14    mins  75028;    Therapeutic Activity:     0     mins  63140;     Gait Trainin     mins  97235;     Ultrasound:     0     mins  28433;    Electrical Stimulation:    0     mins  22323 ( );    Untimed:  Electrical Stimulation:    0     mins  81375 ( );  Mechanical Traction:    0     mins  54777;     Timed Treatment:   54   mins   Total Treatment:     54   mins    Mario Amos PT  Physical Therapist    "

## 2023-01-30 ENCOUNTER — TREATMENT (OUTPATIENT)
Dept: PHYSICAL THERAPY | Facility: CLINIC | Age: 23
End: 2023-01-30
Payer: COMMERCIAL

## 2023-01-30 DIAGNOSIS — M54.2 PAIN, NECK: Primary | ICD-10-CM

## 2023-01-30 DIAGNOSIS — M54.50 CHRONIC BILATERAL LOW BACK PAIN WITHOUT SCIATICA: ICD-10-CM

## 2023-01-30 DIAGNOSIS — G89.29 CHRONIC BILATERAL LOW BACK PAIN WITHOUT SCIATICA: ICD-10-CM

## 2023-01-30 PROCEDURE — 97110 THERAPEUTIC EXERCISES: CPT | Performed by: PHYSICAL THERAPIST

## 2023-01-30 PROCEDURE — 97140 MANUAL THERAPY 1/> REGIONS: CPT | Performed by: PHYSICAL THERAPIST

## 2023-01-30 NOTE — PROGRESS NOTES
Physical Therapy Daily Progress Note    [address] [facility]  [place] [address]      Patient: Daisy Barton   : 2000  Diagnosis/ICD-10 Code:  Pain, neck [M54.2]  Referring practitioner: Vangie Ayala DO  Date of Initial Visit: Type: THERAPY  Noted: 2023  Today's Date: 2023  Patient seen for 5 sessions           Patient reports: having a more severe headache over the past few days. Currently 7/10.       Objective   See Exercise, Manual, and Modality Logs for complete treatment.       Assessment/Plan  Patient responded well to STM to her cervical mm today, with headache reduced to 4/10 afterward. Reviewed DNF lifts, during which she reported intermittent cervicothoracic pain but no suboccipital or headache pain. Treatment shortened as patient reports very poor sleep recently following a recent loss.            Timed:  Manual Therapy:    23     mins  24966;  Therapeutic Exercise:    12     mins  71131;     Neuromuscular Dylan:   0    mins  12657;    Therapeutic Activity:     0     mins  09399;     Gait Trainin     mins  56119;     Ultrasound:     0     mins  68048;    Electrical Stimulation:    0     mins  93982 ( );    Untimed:  Electrical Stimulation:    0     mins  91900 ( );  Mechanical Traction:    0     mins  15747;     Timed Treatment:   35   mins   Total Treatment:     35   mins    Mario Amos PT  Physical Therapist

## 2023-02-02 ENCOUNTER — TREATMENT (OUTPATIENT)
Dept: PHYSICAL THERAPY | Facility: CLINIC | Age: 23
End: 2023-02-02
Payer: COMMERCIAL

## 2023-02-02 DIAGNOSIS — M54.2 PAIN, NECK: Primary | ICD-10-CM

## 2023-02-02 DIAGNOSIS — G89.29 CHRONIC BILATERAL LOW BACK PAIN WITHOUT SCIATICA: ICD-10-CM

## 2023-02-02 DIAGNOSIS — M54.50 CHRONIC BILATERAL LOW BACK PAIN WITHOUT SCIATICA: ICD-10-CM

## 2023-02-02 PROCEDURE — 97530 THERAPEUTIC ACTIVITIES: CPT | Performed by: PHYSICAL THERAPIST

## 2023-02-02 PROCEDURE — 97110 THERAPEUTIC EXERCISES: CPT | Performed by: PHYSICAL THERAPIST

## 2023-02-02 PROCEDURE — 97112 NEUROMUSCULAR REEDUCATION: CPT | Performed by: PHYSICAL THERAPIST

## 2023-02-06 ENCOUNTER — TREATMENT (OUTPATIENT)
Dept: PHYSICAL THERAPY | Facility: CLINIC | Age: 23
End: 2023-02-06
Payer: COMMERCIAL

## 2023-02-06 DIAGNOSIS — G89.29 CHRONIC BILATERAL LOW BACK PAIN WITHOUT SCIATICA: ICD-10-CM

## 2023-02-06 DIAGNOSIS — M54.2 PAIN, NECK: Primary | ICD-10-CM

## 2023-02-06 DIAGNOSIS — M54.50 CHRONIC BILATERAL LOW BACK PAIN WITHOUT SCIATICA: ICD-10-CM

## 2023-02-06 PROCEDURE — 97112 NEUROMUSCULAR REEDUCATION: CPT | Performed by: PHYSICAL THERAPIST

## 2023-02-06 PROCEDURE — 97530 THERAPEUTIC ACTIVITIES: CPT | Performed by: PHYSICAL THERAPIST

## 2023-02-06 PROCEDURE — 97110 THERAPEUTIC EXERCISES: CPT | Performed by: PHYSICAL THERAPIST

## 2023-02-06 NOTE — PROGRESS NOTES
Physical Therapy Daily Progress Note     [facility] [address]  [place] [address]      Patient: Daisy Barton   : 2000  Diagnosis/ICD-10 Code:  Pain, neck [M54.2]  Referring practitioner: Vangie Ayala DO  Date of Initial Visit: Type: THERAPY  Noted: 2023  Today's Date: 2023  Patient seen for 7 sessions         Subjective Questionnaire: Oswestry:  and NDI:     Subjective Evaluation  Patient reports: right-sided headache 7/10. Low back pain was increased low back pain yesterday. No change in low back symptoms with newer exercises, though she noted increased low back strain with single leg bridging.    CLOF: at PLOF checking blind spots, pain sitting after 30-45 min, headaches occurring 3-4x/week, lifting up to 30 lb child from floor mostly without pain    Pain  Pain scale: neck: 0, low back: 3.  Pain scale at lowest: neck: 0, low back: 0.  Pain scale at highest: neck: 5, low back: 7.    Objective          Palpation   Left   No palpable tenderness to the levator scapulae, suboccipitals and upper trapezius.     Right   No palpable tenderness to the levator scapulae, suboccipitals and upper trapezius.     Additional Palpation Details  Joint mobility: headache with C0/1    Active Range of Motion   Cervical/Thoracic Spine   Cervical    Flexion: WFL  Extension: WFL  Left lateral flexion: Neck active lateral bend left: tight. WFL  Right lateral flexion: WFL  Left rotation: WFL  Right rotation: WFL      See Exercise, Manual, and Modality Logs for complete treatment.       Assessment/Plan  SL bridges were painful regardless of modification, so they were held. Testing of neck was fairly unremarkable for headache replication, though R OA slightly referred into her head. Today's visit focused on trunk stability, with good tolerance but fatigue reported.    Subjective reassessment completed, with patient noting good improvement in symptoms and function since starting PT.        Timed:  Manual  Therapy:    0     mins  75306;  Therapeutic Exercise:    12     mins  01003;     Neuromuscular Dylan:   15    mins  83277;    Therapeutic Activity:     15     mins  16194;     Gait Trainin     mins  57796;     Ultrasound:     0     mins  43668;    Electrical Stimulation:    0     mins  05117 ( );    Untimed:  Electrical Stimulation:    0     mins  79011 ( );  Mechanical Traction:    0     mins  43947;     Timed Treatment:   42   mins   Total Treatment:     42   mins    Mario Amos PT  Physical Therapist

## 2023-02-09 ENCOUNTER — TREATMENT (OUTPATIENT)
Dept: PHYSICAL THERAPY | Facility: CLINIC | Age: 23
End: 2023-02-09
Payer: COMMERCIAL

## 2023-02-09 DIAGNOSIS — G89.29 CHRONIC BILATERAL LOW BACK PAIN WITHOUT SCIATICA: ICD-10-CM

## 2023-02-09 DIAGNOSIS — M54.2 PAIN, NECK: Primary | ICD-10-CM

## 2023-02-09 DIAGNOSIS — M54.50 CHRONIC BILATERAL LOW BACK PAIN WITHOUT SCIATICA: ICD-10-CM

## 2023-02-09 PROCEDURE — 97110 THERAPEUTIC EXERCISES: CPT | Performed by: PHYSICAL THERAPIST

## 2023-02-09 PROCEDURE — 97112 NEUROMUSCULAR REEDUCATION: CPT | Performed by: PHYSICAL THERAPIST

## 2023-02-09 PROCEDURE — 97530 THERAPEUTIC ACTIVITIES: CPT | Performed by: PHYSICAL THERAPIST

## 2023-02-09 RX ORDER — PROPRANOLOL HCL 60 MG
60 CAPSULE, EXTENDED RELEASE 24HR ORAL DAILY
Qty: 30 CAPSULE | Refills: 1 | Status: SHIPPED | OUTPATIENT
Start: 2023-02-09 | End: 2023-03-21

## 2023-02-09 NOTE — PROGRESS NOTES
Re-Assessment / Re-Certification      [address] [facility], [address]  [place] [address]    Patient: Daisy Barton   : 2000  Diagnosis/ICD-10 Code:  Pain, neck [M54.2]  Referring practitioner: Vangie Ayala DO  Date of Initial Visit: Type: THERAPY  Noted: 2023  Today's Date: 2023  Patient seen for 8 sessions      Subjective:     Subjective Questionnaire: Oswestry:  and NDI:   Clinical Progress: improved  Home Program Compliance: Yes  Treatment has included: therapeutic exercise, neuromuscular re-education, manual therapy and therapeutic activity    Subjective Evaluation    History of Present Illness  Mechanism of injury: CLOF: at PLOF checking blind spots, pain sitting after 30-45 min, headaches occurring 3-4x/week, lifting up to 30 lb child from floor mostly without pain    Subjective comment: Patient reports feeling good today and yesterday.  Patient Occupation:  Pain  Pain scale: neck: 0, low back: 2.  Pain scale at lowest: neck: 0, low back: 2.  Pain scale at highest: neck: 7, low back: 7.         Objective          Palpation   Left   No palpable tenderness to the levator scapulae, suboccipitals and upper trapezius.   Hypertonic in the iliopsoas and quadratus lumborum.   Tenderness of the iliopsoas and quadratus lumborum.     Right   No palpable tenderness to the levator scapulae, suboccipitals and upper trapezius.   Hypertonic in the quadratus lumborum. Tenderness of the iliopsoas and quadratus lumborum.     Additional Palpation Details  Joint mobility: headache with C0/1  Joint mobility: pain at L5/S1 with relative hypomobility, hypermobile all other lumbar segments and low-mid thoracic    Active Range of Motion   Cervical/Thoracic Spine   Cervical    Flexion: WFL  Extension: WFL  Left lateral flexion: Neck active lateral bend left: tight. WFL  Right lateral flexion: WFL  Left rotation: WFL  Right rotation: WFL    Additional Active Range of Motion Details  Flexion:  touches ankles, low back pain, curve reversal present  Extension: WNL, low back pain    Strength/Myotome Testing     Left Hip   Planes of Motion   Extension: 4+  Abduction: 4+    Right Hip   Planes of Motion   Extension: 4+  Abduction: 4    Muscle Activation     Additional Muscle Activation Details  Fair B gluteal activation during prone hip extension    Tests   Cervical   Deep neck flexor endurance: 5 (lost DNF) seconds      Assessment & Plan     Assessment  Impairments: abnormal muscle firing, activity intolerance, impaired physical strength and pain with function  Functional Limitations: lifting, sleeping, walking, uncomfortable because of pain, sitting and standing  Assessment details: Patient presents with recurrent low back pain R>L and acute neck pain, which has exacerbated chronic migraines.    - Low back pain is consistent with discogenic pain, which recurrently injures during flexion-based activities that require eccentric control by trunk extensors. Multifidus activation was noted to be impaired and most lumbar segments are hypermobile.  - Neck pain- significant intrinsic neck weakness and guarding of global neck muscle were noted. Palpation of these muscles and mobility testing of all upper cervical facets referred into a headache R>L. Though she has managed her migraines via medication, today's examination suggests a possible cervicogenic origin, or at least significant component, of her headaches.     2/9- Patient reports significant improvement in symptom intensity, especially regarding her neck, since starting PT. She demonstrates full, pain free cervical AROM. Low back AROM remains painful. Her BADL function is improving, including tolerance to static positions, though her low back continues to hurt with prolonged sitting and standing. She continues to require additional neck and low back strengthening to allow her to achieve her goals.    Barriers to therapy: recurrent nature of her low back  pain  Prognosis: good    Goals  Plan Goals: Short Term Goals 4 weeks  1. Patient to be compliant with initial HEP. Met   2. Patient to demonstrate pain free cervical ROM. Met   3. Patient to improve NDI score to < 7/50. Met   4. Patient to improve ASIA score to < 5/50. In progress    Long Term Goals 8 weeks  1. Patient to be independent with HEP. Met   2. Patient to report reduced HA frequency to <1x/week. In progress  3. Patient to check L blind spot without needing to turn entire body. Met   4. Patient to demonstrate neck flexor endurance of > 20 sec. In progress  5. Patient to improve NDI score to < 4/50. In progress  6. Patient to improve ASIA score to < 2/50. In progress  7. Sitting to tolerance to improve to >2 hrs. In progress    Plan  Therapy options: will be seen for skilled therapy services  Planned modality interventions: cryotherapy, thermotherapy (hydrocollator packs), dry needling, electrical stimulation/Russian stimulation and TENS  Planned therapy interventions: manual therapy, motor coordination training, neuromuscular re-education, postural training, soft tissue mobilization, spinal/joint mobilization, strengthening, joint mobilization, home exercise program, functional ROM exercises, abdominal trunk stabilization and ADL retraining  Frequency: 2x week  Duration in weeks: 4  Treatment plan discussed with: patient  Plan details: 2x/week for 4 weeks      Progress toward previous goals: Partially Met        Timeframe: 1 month  Prognosis to achieve goals: good    PT Signature: Mario Amos, PT  PT License [id number]    Based upon review of the patient's progress and continued therapy plan, it is my medical opinion that Daisy Barton should continue physical therapy treatment at [facility] PH[facility] THER[facility]  [address] [place] [id number] [phone].    Signature: __________________________________  Vangie Ayala DO    Timed:  Manual Therapy:    0     mins   49467;  Therapeutic Exercise:    15     mins  84380;     Neuromuscular Dylan:    15    mins  22892;    Therapeutic Activity:     15     mins  96295;     Gait Trainin     mins  14001;     Ultrasound:     0     mins  83028;    Electrical Stimulation:    0     mins  03551 ( );    Untimed:  Electrical Stimulation:    0     mins  98376 ( );  Mechanical Traction:    0     mins  94696;     Timed Treatment:   45   mins   Total Treatment:     45   mins

## 2023-02-13 ENCOUNTER — TREATMENT (OUTPATIENT)
Dept: PHYSICAL THERAPY | Facility: CLINIC | Age: 23
End: 2023-02-13
Payer: COMMERCIAL

## 2023-02-13 DIAGNOSIS — G89.29 CHRONIC BILATERAL LOW BACK PAIN WITHOUT SCIATICA: ICD-10-CM

## 2023-02-13 DIAGNOSIS — M54.50 CHRONIC BILATERAL LOW BACK PAIN WITHOUT SCIATICA: ICD-10-CM

## 2023-02-13 DIAGNOSIS — M54.2 PAIN, NECK: Primary | ICD-10-CM

## 2023-02-13 PROCEDURE — 97112 NEUROMUSCULAR REEDUCATION: CPT | Performed by: PHYSICAL THERAPIST

## 2023-02-13 PROCEDURE — 97110 THERAPEUTIC EXERCISES: CPT | Performed by: PHYSICAL THERAPIST

## 2023-02-13 PROCEDURE — 97140 MANUAL THERAPY 1/> REGIONS: CPT | Performed by: PHYSICAL THERAPIST

## 2023-02-13 NOTE — PROGRESS NOTES
Physical Therapy Daily Progress Note    [address] [facility], [address]  [place] [address]      Patient: Daisy Barton   : 2000  Diagnosis/ICD-10 Code:  Pain, neck [M54.2]  Referring practitioner: Vangie Ayala DO  Date of Initial Visit: Type: THERAPY  Noted: 2023  Today's Date: 2023  Patient seen for 9 sessions           Patient reports: L low back pain 6/10 today. Her back hurt over the weekend, as well, but not as bad as today. Neck and head have felt good recently.       Objective   See Exercise, Manual, and Modality Logs for complete treatment.       Assessment/Plan  Mobilized patient's hypomobile/painful lumbar segment for the first time, with no change in pain but improved lumbar flexion AROM afterward. Improved tolerance to eccentric trunk flexion control also noted today.             Timed:  Manual Therapy:    12     mins  44559;  Therapeutic Exercise:    15     mins  15939;     Neuromuscular Dylan:   10    mins  16140;    Therapeutic Activity:     0     mins  22972;     Gait Trainin     mins  59215;     Ultrasound:     0     mins  21352;    Electrical Stimulation:    0     mins  82044 ( );    Untimed:  Electrical Stimulation:    0     mins  68340 ( );  Mechanical Traction:    0     mins  60987;     Timed Treatment:   37   mins   Total Treatment:     37   mins    Mario Amos PT  Physical Therapist

## 2023-02-16 ENCOUNTER — TREATMENT (OUTPATIENT)
Dept: PHYSICAL THERAPY | Facility: CLINIC | Age: 23
End: 2023-02-16
Payer: COMMERCIAL

## 2023-02-16 DIAGNOSIS — G89.29 CHRONIC BILATERAL LOW BACK PAIN WITHOUT SCIATICA: ICD-10-CM

## 2023-02-16 DIAGNOSIS — M54.50 CHRONIC BILATERAL LOW BACK PAIN WITHOUT SCIATICA: ICD-10-CM

## 2023-02-16 DIAGNOSIS — M54.2 PAIN, NECK: Primary | ICD-10-CM

## 2023-02-16 PROCEDURE — 97110 THERAPEUTIC EXERCISES: CPT | Performed by: PHYSICAL THERAPIST

## 2023-02-16 PROCEDURE — 97140 MANUAL THERAPY 1/> REGIONS: CPT | Performed by: PHYSICAL THERAPIST

## 2023-02-16 PROCEDURE — 97112 NEUROMUSCULAR REEDUCATION: CPT | Performed by: PHYSICAL THERAPIST

## 2023-02-16 NOTE — PROGRESS NOTES
Physical Therapy Daily Progress Note    [address] [facility], [address]  [place] [address]      Patient: Daisy Barton   : 2000  Diagnosis/ICD-10 Code:  Pain, neck [M54.2]  Referring practitioner: Vangie Ayala DO  Date of Initial Visit: Type: THERAPY  Noted: 2023  Today's Date: 2023  Patient seen for 10 sessions           Patient reports: her low back hasn't hurt as much since last visit. Her neck and head have also felt good in recent days. Overall, she feels like her neck symptoms are >75% improved and low back is 60% improved.      Objective   See Exercise, Manual, and Modality Logs for complete treatment.       Assessment/Plan  AROM lumbar flexion was painful prior to treatment. It was improved following L5/S1 mobilization, but resolved following repeated lumbar extension, which was trialed for the first time today. Overall she tolerated her low back interventions better today with the addition of repeated extension.            Timed:  Manual Therapy:    11     mins  94905;  Therapeutic Exercise:    20     mins  35092;     Neuromuscular Dylan:   12    mins  66799;    Therapeutic Activity:     0     mins  83713;     Gait Trainin     mins  41858;     Ultrasound:     0     mins  98787;    Electrical Stimulation:    0     mins  63362 ( );    Untimed:  Electrical Stimulation:    0     mins  85184 ( );  Mechanical Traction:    0     mins  21696;     Timed Treatment:   43   mins   Total Treatment:     43   mins    Mario Amos PT  Physical Therapist

## 2023-02-20 ENCOUNTER — TREATMENT (OUTPATIENT)
Dept: PHYSICAL THERAPY | Facility: CLINIC | Age: 23
End: 2023-02-20
Payer: COMMERCIAL

## 2023-02-20 DIAGNOSIS — M54.2 PAIN, NECK: Primary | ICD-10-CM

## 2023-02-20 DIAGNOSIS — M54.50 CHRONIC BILATERAL LOW BACK PAIN WITHOUT SCIATICA: ICD-10-CM

## 2023-02-20 DIAGNOSIS — G89.29 CHRONIC BILATERAL LOW BACK PAIN WITHOUT SCIATICA: ICD-10-CM

## 2023-02-20 PROCEDURE — 97110 THERAPEUTIC EXERCISES: CPT | Performed by: PHYSICAL THERAPIST

## 2023-02-20 PROCEDURE — 97530 THERAPEUTIC ACTIVITIES: CPT | Performed by: PHYSICAL THERAPIST

## 2023-02-20 PROCEDURE — 97112 NEUROMUSCULAR REEDUCATION: CPT | Performed by: PHYSICAL THERAPIST

## 2023-02-20 NOTE — PROGRESS NOTES
Physical Therapy Daily Progress Note    [address] [facility], [address]  [place] [address]      Patient: Daisy Barton   : 2000  Diagnosis/ICD-10 Code:  Pain, neck [M54.2]  Referring practitioner: Vangie Ayala DO  Date of Initial Visit: Type: THERAPY  Noted: 2023  Today's Date: 2023  Patient seen for 11 sessions           Patient reports: being able to better manage low back pain. She has experienced less pain overall in her low back with introduction of lumbar extension-based activities.      Objective   See Exercise, Manual, and Modality Logs for complete treatment.       Assessment/Plan  Progressed posterior chain strengthening without pain. She has responded very well to repeated lumbar extension. Added rows for long-term scapular strengthening as a preventative exercise due to patient having large breasts. Discussed DC planning as patient feels confident with self management of her condition.            Timed:  Manual Therapy:    0     mins  25584;  Therapeutic Exercise:    20     mins  73292;     Neuromuscular Dylan:   10    mins  61327;    Therapeutic Activity:     10     mins  64225;     Gait Trainin     mins  81027;     Ultrasound:     0     mins  08871;    Electrical Stimulation:    0     mins  82333 ( );    Untimed:  Electrical Stimulation:    0     mins  76886 ( );  Mechanical Traction:    0     mins  10307;     Timed Treatment:   40   mins   Total Treatment:     40   mins    Mario Amos PT  Physical Therapist

## 2023-02-27 ENCOUNTER — OFFICE VISIT (OUTPATIENT)
Dept: FAMILY MEDICINE CLINIC | Facility: CLINIC | Age: 23
End: 2023-02-27
Payer: COMMERCIAL

## 2023-02-27 VITALS
HEART RATE: 75 BPM | HEIGHT: 68 IN | WEIGHT: 159.4 LBS | BODY MASS INDEX: 24.16 KG/M2 | DIASTOLIC BLOOD PRESSURE: 60 MMHG | OXYGEN SATURATION: 97 % | SYSTOLIC BLOOD PRESSURE: 116 MMHG | TEMPERATURE: 97.4 F | RESPIRATION RATE: 22 BRPM

## 2023-02-27 DIAGNOSIS — G43.809 OTHER MIGRAINE WITHOUT STATUS MIGRAINOSUS, NOT INTRACTABLE: ICD-10-CM

## 2023-02-27 DIAGNOSIS — F41.9 ANXIETY: Primary | ICD-10-CM

## 2023-02-27 PROCEDURE — 99214 OFFICE O/P EST MOD 30 MIN: CPT | Performed by: STUDENT IN AN ORGANIZED HEALTH CARE EDUCATION/TRAINING PROGRAM

## 2023-02-27 NOTE — PROGRESS NOTES
"Chief Complaint  Med Refill (PT FOLLOW UP )    History of Present Illness     She feels the meds are doing okay and everything is \"more regular\". She would like to continue her current regimen.     Her neck pain is almost non-existant now.     The following portions of the patient's history were reviewed and updated as appropriate: allergies, current medications, past family history, past medical history, past social history, past surgical history, and problem list.    OBJECTIVE:  /60   Pulse 75   Temp 97.4 °F (36.3 °C)   Resp 22   Ht 172.7 cm (68\")   Wt 72.3 kg (159 lb 6.4 oz)   SpO2 97%   BMI 24.24 kg/m²       Physical Exam  Constitutional:       General: She is not in acute distress.     Appearance: Normal appearance.   HENT:      Head: Normocephalic and atraumatic.   Eyes:      Extraocular Movements: Extraocular movements intact.   Cardiovascular:      Rate and Rhythm: Normal rate and regular rhythm.      Heart sounds: No murmur heard.  Pulmonary:      Effort: Pulmonary effort is normal. No respiratory distress.      Breath sounds: Normal breath sounds. No stridor. No wheezing, rhonchi or rales.   Skin:     Findings: No rash.   Neurological:      General: No focal deficit present.      Mental Status: She is alert.   Psychiatric:         Mood and Affect: Mood normal.                    Assessment and Plan   Diagnoses and all orders for this visit:    1. Anxiety (Primary)    2. Other migraine without status migrainosus, not intractable      Continue current regimen with ssri and propranolol.     Return in about 6 months (around 8/27/2023) for Annual.       Vangie Ayala D.O.  [facility]     "

## 2023-02-28 ENCOUNTER — TELEPHONE (OUTPATIENT)
Dept: PHYSICAL THERAPY | Facility: CLINIC | Age: 23
End: 2023-02-28

## 2023-03-02 ENCOUNTER — TREATMENT (OUTPATIENT)
Dept: PHYSICAL THERAPY | Facility: CLINIC | Age: 23
End: 2023-03-02
Payer: COMMERCIAL

## 2023-03-02 DIAGNOSIS — G89.29 CHRONIC BILATERAL LOW BACK PAIN WITHOUT SCIATICA: ICD-10-CM

## 2023-03-02 DIAGNOSIS — M54.50 CHRONIC BILATERAL LOW BACK PAIN WITHOUT SCIATICA: ICD-10-CM

## 2023-03-02 DIAGNOSIS — M54.2 PAIN, NECK: Primary | ICD-10-CM

## 2023-03-02 PROCEDURE — 97530 THERAPEUTIC ACTIVITIES: CPT | Performed by: PHYSICAL THERAPIST

## 2023-03-02 PROCEDURE — 97112 NEUROMUSCULAR REEDUCATION: CPT | Performed by: PHYSICAL THERAPIST

## 2023-03-02 PROCEDURE — 97110 THERAPEUTIC EXERCISES: CPT | Performed by: PHYSICAL THERAPIST

## 2023-03-02 NOTE — PROGRESS NOTES
Physical Therapy Daily Progress Note and Discharge Summary      [address]  [place] [address]    Patient: Daisy Barton   : 2000  Diagnosis/ICD-10 Code:  Pain, neck [M54.2]  Referring practitioner: Vangie Ayala DO  Date of Initial Visit: Type: THERAPY  Noted: 2023  Today's Date: 3/2/2023  Patient seen for 12 sessions      Subjective:     Subjective Questionnaire: Oswestry: 50 and NDI: 50    Subjective Evaluation    History of Present Illness  Mechanism of injury: CLOF: at PLOF checking blind spots, pain sitting after 3 hrs, headaches occurring 2x/week, lifting up to 30 lb child from floor mostly without pain    Subjective comment: Patient reports feeling ready to DC today.  Patient Occupation:  Pain  Pain scale: neck: 4, low back: 1.  Pain scale at lowest: neck: 0, low back: 0.  Pain scale at highest: neck: 4, low back: 5.         Objective          Palpation   Left   Hypertonic in the quadratus lumborum.   Tenderness of the quadratus lumborum.     Additional Palpation Details  Joint mobility: pain at L5/S1 with relative hypomobility, hypermobile all other lumbar segments and low-mid thoracic    Active Range of Motion   Cervical/Thoracic Spine   Cervical    Flexion: WFL  Extension: WFL  Left lateral flexion: WFL  Right lateral flexion: WFL  Left rotation: WFL  Right rotation: WFL    Lumbar   Normal active range of motion    Strength/Myotome Testing     Left Hip   Planes of Motion   Extension: 4+  Abduction: 5    Right Hip   Planes of Motion   Extension: 4+  Abduction: 4+        Goals  Short Term Goals 4 weeks  1. Patient to be compliant with initial HEP. Met   2. Patient to demonstrate pain free cervical ROM. Met   3. Patient to improve NDI score to < 7/50. Met   4. Patient to improve ASIA score to < 5/50. Met     Long Term Goals 8 weeks  1. Patient to be independent with HEP. Met   2. Patient to report reduced HA frequency to <1x/week. Partially met   3.  Patient to check L blind spot without needing to turn entire body. Met   4. Patient to demonstrate neck flexor endurance of > 20 sec. Not met   5. Patient to improve NDI score to < 4/50. Met   6. Patient to improve ASIA score to < 2/50. Not met  7. Sitting to tolerance to improve to >2 hrs. Met     Discharge Summary  Patient reports significant improvements in neck and back symptoms and overall function. She has gained the ability to self manage her conditions via exercise and should continue to improve and maintain her progress. She is ready for discharge at this time.    Timed:  Manual Therapy:    0     mins  99557;  Therapeutic Exercise:    15     mins  21704;     Neuromuscular Dylan:    15    mins  96915;    Therapeutic Activity:     15     mins  05752;     Gait Trainin     mins  85639;     Ultrasound:     0     mins  00682;    Electrical Stimulation:    0     mins  65981 ( );    Untimed:  Electrical Stimulation:    0     mins  60034 ( );  Mechanical Traction:    0     mins  49497;     Timed Treatment:   45   mins   Total Treatment:     45   mins      Mario Amos PT  Physical Therapist

## 2023-03-21 RX ORDER — PROPRANOLOL HCL 60 MG
60 CAPSULE, EXTENDED RELEASE 24HR ORAL DAILY
Qty: 90 CAPSULE | Refills: 0 | Status: SHIPPED | OUTPATIENT
Start: 2023-03-21

## 2023-03-26 NOTE — PROGRESS NOTES
"Subjective   No chief complaint on file.    Daisy Barton is a 22 y.o. year old  presenting to be seen for her annual exam.     SEXUAL Hx:  She {IS/is not:20430::\"is\"} currently sexually active.  In the past year there {New sexual partners?:67590::\"there has been NO new sexual partners\"}.    Condoms {Condom use:45669::\"are never used\"}.  She {would/WOULD NOT:17784::\"would not\"} like to be screened for STD's at today's exam.  Current birth control method: {Current contraceptive:54831}.  She {IS/is not:04260::\"is\"} happy with her current method of contraception and {does / DOES NOT:58728::\"does not\"} want to discuss alternative methods of contraception.  MENSTRUAL Hx:  No LMP recorded. Patient has had an implant.  In the past 6 months her cycles have been {Desc - menses description:17853::\"regular, predictable and occur monthly\"}.  Her menstrual {Misc; menses description:79608::\"flow is typically normal\"}.   Each month on average there are roughly {Numbers; 0-7:88669::\"0\"} day(s) of very heavy flow.  Intermenstrual bleeding is {absent/present:75697::\"absent\"}.    Post-coital bleeding is {absent/present:33709::\"absent\"}.  Dysmenorrhea: {Affecting ADL?:52233::\"is not affecting her activities of daily living\"}  PMS: {Affecting ADL?:32992::\"is not affecting her activities of daily living\"}  Her cycles {are/ARE NOT:55816::\"are not\"} a source of concern for her that she wishes to discuss today.  HEALTH Hx:  She exercises regularly: {Responses; yes/NO/not asked:28958::\"no (and has no plans to become more active)\"}.  She wears her seat belt: {Responses; yes/not/not always (pre-select yes):50526::\"yes\"}.  She has concerns about domestic violence: {Responses; yes/NO/not asked:73305::\"no\"}.    The following portions of the patient's history were reviewed and updated as appropriate:{Misc - History reviewed:89027::\"problem list\",\"current medications\",\"allergies\",\"past family history\",\"past medical history\",\"past social " "history\",\"past surgical history\"}.    Social History    Tobacco Use      Smoking status: Never      Smokeless tobacco: Never    Review of Systems  Constitutional POS: {Constitutional (+) ROS:79413::\"nothing reported\"}    NEG: {Constitutional (-) ROS:24085::\"anorexia\",\"night sweats\"}   Genitourinary POS: { (+) ROS:01191::\"nothing reported\"}    NEG: { (-) ROS:27673::\"dysuria\",\"hematuria\"}      Gastointestinal POS: {GI (+) ROS:71213::\"nothing reported\"}    NEG: {GI (-) ROS:50366::\"bloating\",\"change in bowel habits\",\"melena\",\"reflux symptoms\"}   Integument POS: {Skin (+) ROS:62419::\"nothing reported\"}    NEG: {Skin (-) ROS:10156::\"moles that are changing in size, shape, color\",\"rashes\"}   Breast POS: {Breast (+) ROS:33609::\"nothing reported\"}    NEG: {Breast (-) ROS:29252::\"persistent breast lump\",\"skin dimpling\",\"nipple discharge\"}        Objective   There were no vitals taken for this visit.    General:  {Exam - general findings:98479::\"well developed; well nourished\",\"no acute distress\"}   Skin:  {Exam - skin:68028::\"No suspicious lesions seen\"}   Thyroid: {Exam - thyroid:32650::\"normal to inspection and palpation\"}   Breasts:  {Exam - breasts:70022::\"Examined in supine position\",\"Symmetric without masses or skin dimpling\",\"Nipples normal without inversion, lesions or discharge\",\"There are no palpable axillary nodes\"}   Abdomen: {Exam - abdomen:43362::\"soft, non-tender; no masses\",\"no umbilical or inguinal hernias are present\",\"no hepato-splenomegaly\"}   Pelvis: {Exam; GYN pelvic:02146::\"Clinical staff was present for exam\"}        Assessment   1. ***  2. {Screening exam update:98539::\"She is up to date on all relevant gynecologic and colorectal screenings\"}     Plan   1. Pap was not done today.  I explained to Daisy that the recommendations for Pap smear interval in a low risk patient has lengthened to 3 years time.  I told Daisy she still needs to be seen in our office yearly for a full physical including " "breast and pelvic exam.  2. The following tests were ordered today: STD swabs for GC, chlamydia and trichimoniasis.  It was explained to Daisy that all lab test should be back within the one week after they are performed. She will be notified about the results, regardless of the findings. If she has not been contacted by the office within 2 weeks after the test has been performed, it is her responsibility to contact us to learn about her results.  3. Her vaccine record was reviewed and updated.  4. I discussed with Daisy that she may be behind on needed vaccinations for {Vaccines to update:74372::\"Influenza\"}.  She may be able to obtain these vaccinations at her local pharmacy OR speak about obtaining them with her primary care.  If she does obtain her vaccines, I have asked Daisy to let us know the date each vaccine was obtained so that her medical record could be updated in our system.  5. The importance of keeping all planned follow-up and taking all medications as prescribed was emphasized.  6. Follow up {F/U reason:62813::\"for annual exam\"} *** {follow-up time:22868}    No orders of the defined types were placed in this encounter.         This note was electronically signed.    Tutu Boyce M.D.  March 26, 2023    Part of this note may be an electronic transcription/translation of spoken language to printed text using the Dragon Dictation System.   "

## 2023-03-27 ENCOUNTER — OFFICE VISIT (OUTPATIENT)
Dept: OBSTETRICS AND GYNECOLOGY | Facility: CLINIC | Age: 23
End: 2023-03-27
Payer: COMMERCIAL

## 2023-03-27 VITALS
DIASTOLIC BLOOD PRESSURE: 70 MMHG | RESPIRATION RATE: 14 BRPM | WEIGHT: 160 LBS | SYSTOLIC BLOOD PRESSURE: 118 MMHG | BODY MASS INDEX: 24.33 KG/M2

## 2023-03-27 DIAGNOSIS — Z71.85 VACCINE COUNSELING: ICD-10-CM

## 2023-03-27 DIAGNOSIS — Z01.419 WELL WOMAN EXAM: Primary | ICD-10-CM

## 2023-03-27 DIAGNOSIS — N92.6 IRREGULAR MENSES: ICD-10-CM

## 2023-03-27 NOTE — PROGRESS NOTES
Subjective   Chief Complaint   Patient presents with   • Gynecologic Exam     Daisy Barton is a 22 y.o. year old  presenting to be seen for her annual exam.   She reports her periods have been irregular with her kyleena. She has had increasing cramping as well.   SEXUAL Hx:  She is currently sexually active.  In the past year there there has been NO new sexual partners.    Condoms are used intermittently.  She would not like to be screened for STD's at today's exam.  Current birth control method: IUD - Kyleena.  She is not happy with her current method of contraception and does want to discuss alternative methods of contraception.  MENSTRUAL Hx:  Patient's last menstrual period was 2023 (approximate).  In the past 6 months her cycles have been regular, predictable and occur monthly 28-44 day cycles. .  Her menstrual flow is typically normal.   Each month on average there are roughly 1 day(s) of very heavy flow. Last 7-8 days. She denies any clotting.   Intermenstrual bleeding is absent.    Post-coital bleeding is present - on occassion . No pain with intercourse  Dysmenorrhea: moderate and is not affecting her activities of daily living  PMS: mild and is not affecting her activities of daily living  Her cycles ARE a source of concern for her that she wishes to discuss today.  HEALTH Hx:  She exercises regularly: yes.  She wears her seat belt: yes.  She has concerns about domestic violence: no.  OTHER THINGS SHE WANTS TO DISCUSS TODAY:  Nothing else    The following portions of the patient's history were reviewed and updated as appropriate:problem list, current medications, allergies, past family history, past medical history, past social history and past surgical history.    Social History    Tobacco Use      Smoking status: Never      Smokeless tobacco: Never    Review of Systems  Constitutional POS: nothing reported    NEG: anorexia or night sweats   Genitourinary POS: nothing reported    NEG:  dysuria or hematuria      Gastointestinal POS: nothing reported    NEG: bloating, change in bowel habits, melena or reflux symptoms   Integument POS: nothing reported    NEG: moles that are changing in size, shape, color or rashes   Breast POS: nothing reported    NEG: persistent breast lump, skin dimpling or nipple discharge        Objective   /70   Resp 14   Wt 72.6 kg (160 lb)   LMP 03/22/2023 (Approximate)   BMI 24.33 kg/m²     General:  well developed; well nourished  no acute distress   Skin:  No suspicious lesions seen   Thyroid: normal to inspection and palpation   Breasts:  Examined in supine position  Symmetric without masses or skin dimpling  Nipples normal without inversion, lesions or discharge  There are no palpable axillary nodes   Abdomen: soft, non-tender; no masses  no umbilical or inguinal hernias are present  no hepato-splenomegaly   Pelvis: Clinical staff was present for exam  :  urethral meatus normal;  Vaginal:  normal pink mucosa without prolapse or lesions.  Cervix:  normal appearance. iud strings seen in proper placement extending through cervical os   Uterus:  normal size, shape and consistency.  Adnexa:  normal bimanual exam of the adnexa.  Rectal:  digital rectal exam not performed; anus visually normal appearing.        Assessment   1. Well woman with gynecological exam  2. Irregular menses with Kyleena      Plan     1. Pap was not done today.  I explained to Daisy that the recommendations for Pap smear interval in a low risk patient has lengthened to 3 years time.  I told Daisy she still needs to be seen in our office yearly for a full physical including breast and pelvic exam.  2. Reviewed irregular menses with Kyleena device and increasing cramping.  Schedule back for transvaginal ultrasound for further evaluation.  Discussed changing Kyleena for Mirena.  3. No prescription was given or electronically sent at today's visit  4. The importance of keeping all planned  follow-up and taking all medications as prescribed was emphasized.  5. Today I discussed with Jorge Luisjohny the total recommended calcium intake for a premenopausal female is 1000 mg.  Ideally this should be from dietary sources.  I reviewed calcium content in various foods including milk, fortified orange juice and yogurt.  If she cannot get sufficient calcium through dietary means, it is recommended to supplement with either a multivitamin or calcium to reach her daily goal.  I also reviewed the difference in the bioavailability of calcium carbonate and calcium citrate containing supplements and the importance of taking calcium carbonate containing products with food.  Finally, vitamin D's role in calcium absorption was reviewed and a total daily vitamin D intake of 800 units was recommended.  6. She is up-to-date on all her recommended vaccines  7. Follow up for annual exam 1 year and for transvaginal ultrasound and possible IUD removal and reinsertion of Mirena at her convenience.    No orders of the defined types were placed in this encounter.         This note was electronically signed.    Claudia Juares, SOPHIA  March 27, 2023

## 2023-05-16 ENCOUNTER — OFFICE VISIT (OUTPATIENT)
Dept: OBSTETRICS AND GYNECOLOGY | Facility: CLINIC | Age: 23
End: 2023-05-16
Payer: COMMERCIAL

## 2023-05-16 VITALS
DIASTOLIC BLOOD PRESSURE: 60 MMHG | BODY MASS INDEX: 24.1 KG/M2 | HEIGHT: 68 IN | WEIGHT: 159 LBS | SYSTOLIC BLOOD PRESSURE: 120 MMHG

## 2023-05-16 DIAGNOSIS — Z30.433 ENCOUNTER FOR REMOVAL AND REINSERTION OF INTRAUTERINE CONTRACEPTIVE DEVICE (IUD): Primary | ICD-10-CM

## 2023-05-16 NOTE — PROGRESS NOTES
IUD Removal and Immediate Reinsertion    No LMP recorded. Patient has had an implant.    Date of procedure:  5/16/2023    Risks and benefits discussed? yes  All questions answered? yes  Consents given by the patient  Written consent obtained? yes  Reason for removal: Side effect: irregular bleeding    Local anesthesia used:  no    Procedure documentation:    A speculum was placed in order to view the cervix.  A tenaculum did not need to be placed on the anterior cervical lip.  Cervical dilation did not need to be performed in order to access the string.  The IUD string was easily seen.  The string was grasped and the IUD was removed without difficulty.  The IUD did not appear to be adherent to the uterine cavity. It was removed intact.    A sterile speculum was replaced and the cervix was cleansed with an antiseptic solution.  Vaginal discharge was scant.  The anterior lip of the cervix was grasped with a tenaculum and the uterine cavity was gently sounded.  There was mild difficulty passing the sound through the cervix.  Cervical dilation did not need to be performed prior to placing the IUD.  The uterus was anteverted and sounded to 8 cms.  The Mirena was then prepared per the manufacturers instructions.    The Mirena was advanced to a point 2 cms from the fundus and then the arms were released from the sheath.  The device was advanced to the fundus and the device was released fully from the sheath.. The string was cut 3 cms in length.  Bleeding from the cervix was scant.    She tolerated the procedure without any difficulty.     Post procedure instructions: It was reviewed that the Mirena will not alter the timing of when she bleeds but it may decrease the quantity of flow and cramps.  Roughly 30% of people will be amenorrheic over time.  Efficacy rate of 99.2% over 8 years was discussed.  Spontaneous expulsion rate of 1-2% was also discussed.  If she has any issue with fever or excessive bleeding or pain she is  to call the office immediately.  Otherwise I would like to see her back in 6 weeks with an ultrasound to confirm correct placement.    This note was electronically signed.  Claudia Juares, APRN  May 16, 2023

## 2023-06-17 RX ORDER — PROPRANOLOL HCL 60 MG
60 CAPSULE, EXTENDED RELEASE 24HR ORAL DAILY
Qty: 90 CAPSULE | Refills: 0 | Status: SHIPPED | OUTPATIENT
Start: 2023-06-17

## 2023-06-23 ENCOUNTER — TELEPHONE (OUTPATIENT)
Dept: FAMILY MEDICINE CLINIC | Facility: CLINIC | Age: 23
End: 2023-06-23

## 2023-06-23 NOTE — TELEPHONE ENCOUNTER
"Caller: Daisy Higgins \"Jay\"    Relationship: Self    Best call back number: [phone]     What form or medical record are you requesting: LETTER STATING WHY JAY WOULD BE A GOOD CANDIDATE FOR THE REDUCTION. ALSO INCLUDE ANY TREATMENTS SHE'S GONE THROUGH    Who is requesting this form or medical record from you: [facility]    How would you like to receive the form or medical records (pick-up, mail, fax): FAX      Timeframe paperwork needed: AS SOON AS POSSIBLE  "

## 2023-07-24 ENCOUNTER — OFFICE VISIT (OUTPATIENT)
Dept: FAMILY MEDICINE CLINIC | Facility: CLINIC | Age: 23
End: 2023-07-24
Payer: COMMERCIAL

## 2023-07-24 VITALS
BODY MASS INDEX: 23.55 KG/M2 | OXYGEN SATURATION: 97 % | DIASTOLIC BLOOD PRESSURE: 76 MMHG | HEIGHT: 68 IN | HEART RATE: 97 BPM | TEMPERATURE: 98.7 F | SYSTOLIC BLOOD PRESSURE: 98 MMHG | WEIGHT: 155.4 LBS

## 2023-07-24 DIAGNOSIS — F41.9 ANXIETY: Primary | ICD-10-CM

## 2023-07-24 PROCEDURE — 99213 OFFICE O/P EST LOW 20 MIN: CPT | Performed by: STUDENT IN AN ORGANIZED HEALTH CARE EDUCATION/TRAINING PROGRAM

## 2023-07-24 RX ORDER — FLUOXETINE 10 MG/1
10 CAPSULE ORAL DAILY
Qty: 60 CAPSULE | Refills: 0 | Status: SHIPPED | OUTPATIENT
Start: 2023-07-24

## 2023-07-24 NOTE — PROGRESS NOTES
"Chief Complaint  Anxiety (F/u on medication dizzy )    History of Present Illness      Anxiety  She is unsure how the buspar did for her. She did feel very dizzy on it. She started with 20 mg bid but this made her feel very dizzy. She went down to 10 mg at night for a couple weeks and the dizziness did not improve.     The following portions of the patient's history were reviewed and updated as appropriate: allergies, current medications, past family history, past medical history, past social history, past surgical history, and problem list.    OBJECTIVE:  BP 98/76   Pulse 97   Temp 98.7 °F (37.1 °C)   Ht 172.7 cm (68\")   Wt 70.5 kg (155 lb 6.4 oz)   SpO2 97%   BMI 23.63 kg/m²       Physical Exam  Constitutional:       General: She is not in acute distress.     Appearance: Normal appearance.   HENT:      Head: Normocephalic and atraumatic.   Eyes:      Extraocular Movements: Extraocular movements intact.   Cardiovascular:      Rate and Rhythm: Normal rate and regular rhythm.      Heart sounds: No murmur heard.  Pulmonary:      Effort: Pulmonary effort is normal. No respiratory distress.      Breath sounds: Normal breath sounds. No stridor. No wheezing, rhonchi or rales.   Skin:     Findings: No rash.   Neurological:      General: No focal deficit present.      Mental Status: She is alert.   Psychiatric:         Mood and Affect: Mood normal.                  Assessment and Plan   Diagnoses and all orders for this visit:    1. Anxiety (Primary)    Start prozac  Discussed that this medication can be associated with a bleeding risk, risk of fractures in those >50 years old, low sodium, glaucoma, sexual dysfunction, suicidal thoughts and worsening depression. It cannot be stopped abruptly. Do not take with alcohol. Do not take in pregnancy. She will let us know if she becomes pregnant.   Counseled on side effects including headache, dizziness, nausea. The patient understands these risks.       Return in about 1 " month (around 8/24/2023).       Vangie Ayala D.O.  [facility] [place]

## 2023-08-24 ENCOUNTER — OFFICE VISIT (OUTPATIENT)
Dept: FAMILY MEDICINE CLINIC | Facility: CLINIC | Age: 23
End: 2023-08-24
Payer: COMMERCIAL

## 2023-08-24 VITALS
WEIGHT: 155 LBS | HEIGHT: 68 IN | DIASTOLIC BLOOD PRESSURE: 64 MMHG | SYSTOLIC BLOOD PRESSURE: 120 MMHG | HEART RATE: 60 BPM | TEMPERATURE: 98.7 F | BODY MASS INDEX: 23.49 KG/M2 | OXYGEN SATURATION: 98 % | RESPIRATION RATE: 18 BRPM

## 2023-08-24 DIAGNOSIS — F41.9 ANXIETY: Primary | ICD-10-CM

## 2023-08-24 PROCEDURE — 99213 OFFICE O/P EST LOW 20 MIN: CPT | Performed by: STUDENT IN AN ORGANIZED HEALTH CARE EDUCATION/TRAINING PROGRAM

## 2023-08-24 NOTE — PROGRESS NOTES
"Chief Complaint  Anxiety    Anxiety            Anxiety  She feels the prozac has helped with most of the obsessive thoughts.   It has affected her sex drive but not as bad as zoloft so she feels this is tolerable. No other side effects.  notices when she gets upset it is not as\"as big of a deal \" anymore. She is able to control emotions more.         The following portions of the patient's history were reviewed and updated as appropriate: allergies, current medications, past family history, past medical history, past social history, past surgical history, and problem list.    OBJECTIVE:  /64   Pulse 60   Temp 98.7 øF (37.1 øC) (Temporal)   Resp 18   Ht 172.7 cm (67.99\")   Wt 70.3 kg (155 lb)   SpO2 98%   BMI 23.57 kg/mý       Physical Exam  Constitutional:       General: She is not in acute distress.     Appearance: Normal appearance.   HENT:      Head: Normocephalic and atraumatic.   Eyes:      Extraocular Movements: Extraocular movements intact.   Cardiovascular:      Rate and Rhythm: Normal rate and regular rhythm.      Heart sounds: No murmur heard.  Pulmonary:      Effort: Pulmonary effort is normal. No respiratory distress.      Breath sounds: Normal breath sounds. No stridor. No wheezing, rhonchi or rales.   Skin:     Findings: No rash.   Neurological:      General: No focal deficit present.      Mental Status: She is alert.   Psychiatric:         Mood and Affect: Mood normal.                  Assessment and Plan   Diagnoses and all orders for this visit:    1. Anxiety (Primary)      Cont prozac    Return in about 6 months (around 2/24/2024) for Annual.       Vangie Ayala D.O.  [facility] [place]   "

## 2023-09-12 RX ORDER — PROPRANOLOL HCL 60 MG
60 CAPSULE, EXTENDED RELEASE 24HR ORAL DAILY
Qty: 90 CAPSULE | Refills: 0 | Status: SHIPPED | OUTPATIENT
Start: 2023-09-12

## 2023-09-20 RX ORDER — FLUOXETINE 10 MG/1
10 CAPSULE ORAL DAILY
Qty: 60 CAPSULE | Refills: 0 | Status: SHIPPED | OUTPATIENT
Start: 2023-09-20

## 2023-11-15 ENCOUNTER — OFFICE VISIT (OUTPATIENT)
Dept: FAMILY MEDICINE CLINIC | Facility: CLINIC | Age: 23
End: 2023-11-15
Payer: COMMERCIAL

## 2023-11-15 VITALS
HEART RATE: 99 BPM | HEIGHT: 68 IN | WEIGHT: 147 LBS | BODY MASS INDEX: 22.28 KG/M2 | SYSTOLIC BLOOD PRESSURE: 94 MMHG | TEMPERATURE: 99.1 F | RESPIRATION RATE: 19 BRPM | OXYGEN SATURATION: 99 % | DIASTOLIC BLOOD PRESSURE: 58 MMHG

## 2023-11-15 DIAGNOSIS — R50.9 FEVER, UNSPECIFIED FEVER CAUSE: Primary | ICD-10-CM

## 2023-11-15 LAB
EXPIRATION DATE: NORMAL
FLUAV RNA RESP QL NAA+PROBE: NEGATIVE
FLUBV RNA RESP QL NAA+PROBE: NEGATIVE
INTERNAL CONTROL: NORMAL
Lab: NORMAL
S PYO RRNA THROAT QL PROBE: NEGATIVE
SARS-COV-2 AG UPPER RESP QL IA.RAPID: NORMAL

## 2023-11-15 PROCEDURE — 99213 OFFICE O/P EST LOW 20 MIN: CPT | Performed by: STUDENT IN AN ORGANIZED HEALTH CARE EDUCATION/TRAINING PROGRAM

## 2023-11-15 PROCEDURE — 87426 SARSCOV CORONAVIRUS AG IA: CPT | Performed by: STUDENT IN AN ORGANIZED HEALTH CARE EDUCATION/TRAINING PROGRAM

## 2023-11-15 PROCEDURE — 87651 STREP A DNA AMP PROBE: CPT | Performed by: STUDENT IN AN ORGANIZED HEALTH CARE EDUCATION/TRAINING PROGRAM

## 2023-11-15 PROCEDURE — 87502 INFLUENZA DNA AMP PROBE: CPT | Performed by: STUDENT IN AN ORGANIZED HEALTH CARE EDUCATION/TRAINING PROGRAM

## 2023-11-15 RX ORDER — AMOXICILLIN AND CLAVULANATE POTASSIUM 875; 125 MG/1; MG/1
1 TABLET, FILM COATED ORAL 2 TIMES DAILY
Qty: 20 TABLET | Refills: 0 | Status: SHIPPED | OUTPATIENT
Start: 2023-11-15

## 2023-11-15 NOTE — PROGRESS NOTES
"Chief Complaint  URI (Fever, congestion, body aches, headache that started at 3 this morning. Pt states mucus she is coughing up is like anorange color. )    URI         Main symptom: sore throat   Began: this am   Sick contacts: kjids at school have possibly been sick with cough and possible rsv   Fever: was 100.9 this am   No complaints of SOB/cp  GI symptoms: not present but she felt nausea on the drive.   Cough: not present   Aches: present  She has bilateral ear pain      The following portions of the patient's history were reviewed and updated as appropriate: allergies, current medications, past family history, past medical history, past social history, past surgical history, and problem list.    OBJECTIVE:  BP 94/58   Pulse 99   Temp 99.1 °F (37.3 °C) (Temporal)   Resp 19   Ht 172.7 cm (67.99\")   Wt 66.7 kg (147 lb)   SpO2 99%   BMI 22.36 kg/m²       Physical Exam  Constitutional:       General: She is not in acute distress.     Appearance: Normal appearance.   HENT:      Head: Normocephalic and atraumatic.      Ears:      Comments: Right ear clear effusion no redness or bulging  Left ear tm has bulging diffuse redness and a purulent effusion  Both canals normal     Mouth/Throat:      Pharynx: Posterior oropharyngeal erythema present. No oropharyngeal exudate.   Eyes:      Extraocular Movements: Extraocular movements intact.   Cardiovascular:      Rate and Rhythm: Normal rate and regular rhythm.      Heart sounds: No murmur heard.  Pulmonary:      Effort: Pulmonary effort is normal. No respiratory distress.      Breath sounds: Normal breath sounds. No stridor. No wheezing, rhonchi or rales.   Skin:     Findings: No rash.   Neurological:      General: No focal deficit present.      Mental Status: She is alert.   Psychiatric:         Mood and Affect: Mood normal.                    Assessment and Plan   Diagnoses and all orders for this visit:    1. Fever, unspecified fever cause (Primary)  -     POCT " Flu A&B, Molecular  -     POCT SARS-CoV-2 Antigen  -     POCT Strep A, molecular    Other orders  -     amoxicillin-clavulanate (AUGMENTIN) 875-125 MG per tablet; Take 1 tablet by mouth 2 (Two) Times a Day.  Dispense: 20 tablet; Refill: 0      Continue with supportive care.   Give augmentin for left acute otitis media.   Return if symptoms worsen or do not improve.     Return if symptoms worsen or fail to improve, for Next scheduled follow up.       Vangie Ayala D.O.  [facility] [place]

## 2023-11-25 RX ORDER — FLUOXETINE 10 MG/1
10 CAPSULE ORAL DAILY
Qty: 60 CAPSULE | Refills: 0 | Status: SHIPPED | OUTPATIENT
Start: 2023-11-25

## 2023-12-04 ENCOUNTER — TELEPHONE (OUTPATIENT)
Dept: FAMILY MEDICINE CLINIC | Facility: CLINIC | Age: 23
End: 2023-12-04

## 2023-12-04 NOTE — TELEPHONE ENCOUNTER
"  Caller: Daisy Higgins \"Maxine\"    Relationship: Self    Best call back number: [phone]     What is the best time to reach you: ANYTIME    Who are you requesting to speak with (clinical staff, provider,  specific staff member): CLINICAL OR PROVIDER    Do you know the name of the person who called: NO    What was the call regarding: PATIENT IS CALLING TO DISCUSS INCREASING HER PROZAC MEDICATION .     Is it okay if the provider responds through MyChart: NO          "

## 2023-12-08 ENCOUNTER — TELEPHONE (OUTPATIENT)
Dept: FAMILY MEDICINE CLINIC | Facility: CLINIC | Age: 23
End: 2023-12-08

## 2023-12-08 RX ORDER — PROPRANOLOL HCL 60 MG
60 CAPSULE, EXTENDED RELEASE 24HR ORAL DAILY
Qty: 90 CAPSULE | Refills: 0 | Status: SHIPPED | OUTPATIENT
Start: 2023-12-08

## 2023-12-08 NOTE — TELEPHONE ENCOUNTER
"  Caller: Daisy Higgins \"Maxine\"    Relationship to patient: Self    Best call back number: [phone]     Chief complaint: MEDICATION CHANGE    Type of visit: ROLANDT    Requested date: NEXT WEEK    If rescheduling, when is the original appointment: 12/9/23     Additional notes:  DR BYRNE DOES NOT HAVE ANY VISITS UNTIL JANUARY 22, 2024.        "

## 2023-12-11 NOTE — TELEPHONE ENCOUNTER
Patient would like to be worked in with dr tom before the first available please let front know if we can accommodate

## 2024-01-22 ENCOUNTER — TELEMEDICINE (OUTPATIENT)
Dept: FAMILY MEDICINE CLINIC | Facility: CLINIC | Age: 24
End: 2024-01-22
Payer: COMMERCIAL

## 2024-01-22 DIAGNOSIS — F41.9 ANXIETY: Primary | ICD-10-CM

## 2024-01-22 PROCEDURE — 99214 OFFICE O/P EST MOD 30 MIN: CPT | Performed by: STUDENT IN AN ORGANIZED HEALTH CARE EDUCATION/TRAINING PROGRAM

## 2024-01-22 RX ORDER — FLUOXETINE HYDROCHLORIDE 20 MG/1
20 CAPSULE ORAL DAILY
Qty: 90 CAPSULE | Refills: 0 | Status: SHIPPED | OUTPATIENT
Start: 2024-01-22

## 2024-01-22 NOTE — PROGRESS NOTES
You have chosen to receive care through a telehealth visit.  Do you consent to use a video/audio connection for your medical care today? Yes   Patient and provider in [place]  Chief Complaint  Daisy Barton is a 23 y.o. female who presents via video-conference for followu p    History of Present Illness      She has not been doing well mentally she says. She had to rehome her dog which was very hard. She has been working with her therapist. The dog gave her a lot of stress. She thought rehomeing would help but it did not. She feels constantly anxious. She had some sadness that was new for her. She is doing  abit better from the depression but still not feeling well.   She notes this is the first week she had since before Christmas. Worry is her biggest symptom. Her symptoms are every single day. She does not have any thoughts of self harm. She feels constant insecurity and unsure. She has difficulty making decisions. This has affected her relationships as she has been more shy lately. It also has been affecting her at work. She continues to have negative thoughts.       Migraines  Propranolol has significantly decreased migraines       The following portions of the patient's history were reviewed and updated as appropriate: allergies, current medications, past family history, past medical history, past social history, past surgical history, and problem list.        Objective  Vital signs available: No      Assessment/Plan   Anxiety  Sadness    Will increase her prozac. Continue to work with therapy. Counseled on side effects such as headache, nausea, dizziness, mood changes or feeling worse before feeling better. For any issues she will schedule a visit sooner. Advised her to follow up in 1 mo.     Vangie Ayala D.O.  [facility] [place]     15 minutes were spent reviewing the patient's questionnaire, formulating a treatment plan, and relaying information to the patient via [employer].

## 2024-02-26 ENCOUNTER — OFFICE VISIT (OUTPATIENT)
Dept: FAMILY MEDICINE CLINIC | Facility: CLINIC | Age: 24
End: 2024-02-26
Payer: COMMERCIAL

## 2024-02-26 VITALS
RESPIRATION RATE: 21 BRPM | DIASTOLIC BLOOD PRESSURE: 64 MMHG | HEIGHT: 68 IN | SYSTOLIC BLOOD PRESSURE: 112 MMHG | OXYGEN SATURATION: 98 % | HEART RATE: 82 BPM | BODY MASS INDEX: 22.61 KG/M2 | WEIGHT: 149.2 LBS | TEMPERATURE: 98.6 F

## 2024-02-26 DIAGNOSIS — F41.9 ANXIETY: Primary | ICD-10-CM

## 2024-02-26 PROCEDURE — 99214 OFFICE O/P EST MOD 30 MIN: CPT | Performed by: STUDENT IN AN ORGANIZED HEALTH CARE EDUCATION/TRAINING PROGRAM

## 2024-02-26 RX ORDER — FLUOXETINE HYDROCHLORIDE 20 MG/1
20 CAPSULE ORAL 2 TIMES DAILY
Qty: 60 CAPSULE | Refills: 0 | Status: SHIPPED | OUTPATIENT
Start: 2024-02-26

## 2024-02-26 NOTE — PROGRESS NOTES
"Chief Complaint  Anxiety    Anxiety            Anxiety  She has been doing therapy which helps. She feels better than our last visit.   No unwanted se from increasing prozac. She over thinks things in relationships. She likes the med but feels that it doesn't work into the night but feels well directly after taking it. She does therapy once every two weeks.     The following portions of the patient's history were reviewed and updated as appropriate: allergies, current medications, past family history, past medical history, past social history, past surgical history, and problem list.    OBJECTIVE:  /64   Pulse 82   Temp 98.6 °F (37 °C) (Temporal)   Resp 21   Ht 172.7 cm (67.99\")   Wt 67.7 kg (149 lb 3.2 oz)   SpO2 98%   BMI 22.69 kg/m²       Physical Exam  Constitutional:       General: She is not in acute distress.     Appearance: Normal appearance.   HENT:      Head: Normocephalic and atraumatic.   Eyes:      Extraocular Movements: Extraocular movements intact.   Skin:     Findings: No rash.   Neurological:      General: No focal deficit present.      Mental Status: She is alert.   Psychiatric:         Mood and Affect: Mood normal.                    Assessment and Plan   Diagnoses and all orders for this visit:    1. Anxiety (Primary)    Other orders  -     FLUoxetine (PROzac) 20 MG capsule; Take 1 capsule by mouth 2 (Two) Times a Day.  Dispense: 60 capsule; Refill: 0    Anxiety  Recurrent worry not controlled. She is okay to increase her med and prefers twice daily dosing as she feels it is wearing off end of day. This is reasonable to do bid.   Counseled on nausea headache dizziness mood changes increasing med. She will monitor.     Do not take if pregnancy occurs.     Return in about 1 month (around 3/26/2024).       Vangie Ayala D.O.  [facility] [place]   "

## 2024-03-04 RX ORDER — PROPRANOLOL HCL 60 MG
60 CAPSULE, EXTENDED RELEASE 24HR ORAL DAILY
Qty: 90 CAPSULE | Refills: 0 | Status: SHIPPED | OUTPATIENT
Start: 2024-03-04

## 2024-03-29 ENCOUNTER — TELEMEDICINE (OUTPATIENT)
Dept: FAMILY MEDICINE CLINIC | Facility: CLINIC | Age: 24
End: 2024-03-29
Payer: COMMERCIAL

## 2024-03-29 DIAGNOSIS — F41.9 ANXIETY: Primary | ICD-10-CM

## 2024-03-29 PROCEDURE — 99213 OFFICE O/P EST LOW 20 MIN: CPT | Performed by: STUDENT IN AN ORGANIZED HEALTH CARE EDUCATION/TRAINING PROGRAM

## 2024-03-29 NOTE — PROGRESS NOTES
"You have chosen to receive care through a telehealth visit.  Do you consent to use a video/audio connection for your medical care today? Yes     Chief Complaint  Daisy Barton is a 23 y.o. female who presents via video-conference for follow up    History of Present Illness      Anxiety  We increased prozac at last visit. She feels more \"upbeat\". She still has some moments of extreme emotion as she feels she is an emotional person, but she likes it and would like to continue this dose. Her thoughts have improved. No suicidal thoughts.       The following portions of the patient's history were reviewed and updated as appropriate: allergies, current medications, past family history, past medical history, past social history, past surgical history, and problem list.      Objective  Vital signs available: No      Assessment/Plan   Anxiety    Continue prozac.       Vangie Ayala D.O.  [facility] [place]     15 minutes were spent reviewing the patient's questionnaire, formulating a treatment plan, and relaying information to the patient via [facility].  "

## 2024-04-08 ENCOUNTER — OFFICE VISIT (OUTPATIENT)
Dept: OBSTETRICS AND GYNECOLOGY | Facility: CLINIC | Age: 24
End: 2024-04-08
Payer: COMMERCIAL

## 2024-04-08 VITALS
BODY MASS INDEX: 22.96 KG/M2 | RESPIRATION RATE: 16 BRPM | SYSTOLIC BLOOD PRESSURE: 116 MMHG | DIASTOLIC BLOOD PRESSURE: 70 MMHG | WEIGHT: 151 LBS

## 2024-04-08 DIAGNOSIS — Z20.2 POSSIBLE EXPOSURE TO SEXUALLY TRANSMITTED INFECTION: ICD-10-CM

## 2024-04-08 DIAGNOSIS — Z30.431 IUD CHECK UP: ICD-10-CM

## 2024-04-08 DIAGNOSIS — Z01.419 ENCOUNTER FOR WELL WOMAN EXAM WITH ROUTINE GYNECOLOGICAL EXAM: Primary | ICD-10-CM

## 2024-04-08 PROCEDURE — 99459 PELVIC EXAMINATION: CPT | Performed by: NURSE PRACTITIONER

## 2024-04-08 PROCEDURE — 99395 PREV VISIT EST AGE 18-39: CPT | Performed by: NURSE PRACTITIONER

## 2024-04-08 NOTE — PROGRESS NOTES
Subjective   Chief Complaint   Patient presents with    Annual Exam     Daisy Barton is a 23 y.o. year old  presenting to be seen for her annual exam.   Her last pap was  with normal cytology.   SEXUAL Hx:  She is currently sexually active.  In the past year there there has been NO new sexual partners.    Condoms are never used.  She would like to be screened for STD's at today's exam.  Current birth control method: IUD - Mirena. Inserted 2023  She is happy with her current method of contraception and does not want to discuss alternative methods of contraception.  MENSTRUAL Hx:  Patient's last menstrual period was 2024.  In the past 6 months her cycles have been infrequent.  Her menstrual flow is typically light.   Each month on average there are roughly 0 day(s) of very heavy flow.    Intermenstrual bleeding is absent.    Post-coital bleeding is absent.  Dysmenorrhea: is not affecting her activities of daily living Had painful cramping with her cycles since her iud reinsertion until about   PMS: is not affecting her activities of daily living  Her cycles are not a source of concern for her that she wishes to discuss today.  HEALTH Hx:  She exercises regularly: yes. Light- still recovering from breast reduction.   She wears her seat belt: yes.  She has concerns about domestic violence: no.  OTHER THINGS SHE WANTS TO DISCUSS TODAY:  Nothing else    The following portions of the patient's history were reviewed and updated as appropriate:problem list, current medications, allergies, past family history, past medical history, past social history, and past surgical history.    Social History    Tobacco Use      Smoking status: Never      Smokeless tobacco: Never    Review of Systems  Constitutional POS: nothing reported    NEG: anorexia or night sweats   Genitourinary POS: nothing reported    NEG: dysuria or hematuria      Gastointestinal POS: nothing reported    NEG: bloating, change in  bowel habits, melena, or reflux symptoms   Integument POS: nothing reported    NEG: moles that are changing in size, shape, color or rashes   Breast POS: nothing reported    NEG: persistent breast lump, skin dimpling, or nipple discharge        Objective   /70   Resp 16   Wt 68.5 kg (151 lb)   LMP 03/25/2024   BMI 22.96 kg/m²     General:  well developed; well nourished  no acute distress   Skin:  No suspicious lesions seen   Thyroid: normal to inspection and palpation   Breasts:  Examined in supine position  Symmetric without masses or skin dimpling  Nipples normal without inversion, lesions or discharge  There are no palpable axillary nodes  Bilateral breast reduction scars are noted dense fibrous breast tissue noted most prominent in upper outer quadrant of right breast     Abdomen: soft, non-tender; no masses  no umbilical or inguinal hernias are present  no hepato-splenomegaly   Pelvis: Clinical staff was present for exam  :  urethral meatus normal;  Vaginal:  normal pink mucosa without prolapse or lesions.  Cervix:  normal appearance. IUD string present - 3 cms in length; menstrual bleeding noted   Uterus:  normal size, shape and consistency.  Adnexa:  normal bimanual exam of the adnexa.  Rectal:  digital rectal exam not performed; anus visually normal appearing.        Assessment   Well woman with routine gynecological exam  Iud in place     Plan     Pap and STD testing was done today.  If she does not receive the results of the Pap within 2 weeks  time, she was instructed to call to find out the results.  I explained to Daisy that the recommendations for Pap smear interval in a low risk patient's has lengthened to 3 years time.  I encouraged her to be seen yearly for a full physical exam including breast and pelvic exam even during the off years when PAP's will not be performed.  Iud strings noted in proper placement on pelvic exam  No prescription was given or electronically sent at today's  visit  The importance of keeping all planned follow-up and taking all medications as prescribed was emphasized.  Today I discussed with Daisy the total recommended calcium intake for a premenopausal female is 1000 mg.  Ideally this should be from dietary sources.  I reviewed calcium content in various foods including milk, fortified orange juice and yogurt.  If she cannot get sufficient calcium through dietary means, it is recommended to supplement with either a multivitamin or calcium to reach her daily goal.  I also reviewed the difference in the bioavailability of calcium carbonate and calcium citrate containing supplements and the importance of taking calcium carbonate containing products with food.  Finally, vitamin D's role in calcium absorption was reviewed and a total daily vitamin D intake of 800 units was recommended  Follow up for annual exam 1 year    No orders of the defined types were placed in this encounter.         This note was electronically signed.    SOPHIA Dean  April 8, 2024

## 2024-04-11 LAB — REF LAB TEST METHOD: NORMAL

## 2024-04-20 RX ORDER — FLUOXETINE HYDROCHLORIDE 20 MG/1
20 CAPSULE ORAL DAILY
Qty: 90 CAPSULE | Refills: 0 | Status: SHIPPED | OUTPATIENT
Start: 2024-04-20

## 2024-06-01 RX ORDER — PROPRANOLOL HCL 60 MG
60 CAPSULE, EXTENDED RELEASE 24HR ORAL DAILY
Qty: 90 CAPSULE | Refills: 0 | Status: SHIPPED | OUTPATIENT
Start: 2024-06-01

## 2024-07-22 RX ORDER — FLUOXETINE HYDROCHLORIDE 20 MG/1
20 CAPSULE ORAL DAILY
Qty: 90 CAPSULE | Refills: 0 | Status: SHIPPED | OUTPATIENT
Start: 2024-07-22

## 2024-08-22 ENCOUNTER — TELEPHONE (OUTPATIENT)
Dept: FAMILY MEDICINE CLINIC | Facility: CLINIC | Age: 24
End: 2024-08-22

## 2024-08-22 ENCOUNTER — OFFICE VISIT (OUTPATIENT)
Dept: FAMILY MEDICINE CLINIC | Facility: CLINIC | Age: 24
End: 2024-08-22
Payer: COMMERCIAL

## 2024-08-22 VITALS
WEIGHT: 149 LBS | TEMPERATURE: 98.4 F | RESPIRATION RATE: 21 BRPM | DIASTOLIC BLOOD PRESSURE: 62 MMHG | BODY MASS INDEX: 22.58 KG/M2 | HEART RATE: 70 BPM | OXYGEN SATURATION: 98 % | SYSTOLIC BLOOD PRESSURE: 114 MMHG | HEIGHT: 68 IN

## 2024-08-22 DIAGNOSIS — F41.9 ANXIETY: Primary | ICD-10-CM

## 2024-08-22 PROCEDURE — 99213 OFFICE O/P EST LOW 20 MIN: CPT | Performed by: STUDENT IN AN ORGANIZED HEALTH CARE EDUCATION/TRAINING PROGRAM

## 2024-08-22 RX ORDER — HYDROXYZINE HYDROCHLORIDE 25 MG/1
25 TABLET, FILM COATED ORAL EVERY 4 HOURS PRN
COMMUNITY
Start: 2024-08-01

## 2024-08-22 RX ORDER — ESCITALOPRAM OXALATE 10 MG/1
10 TABLET ORAL DAILY
COMMUNITY
Start: 2024-05-30

## 2024-08-22 NOTE — PROGRESS NOTES
"Chief Complaint  Anxiety    Anxiety        Anxiety  Symptoms started around the time school started. She saw psychiatry recently and she was diagnosed with ADHD with PTSD and anxiety. She was switched to lexapro to see if that would help with ADD as well as anxiety but her anxiety has been extreme at work. She has a very hard class with no help other than her para she says. She reports having \"mental break downs\" 7 times since the 7 days of school. She feels she needs a break from work in order to collect herself. She has not had to take the vistaril yet. The break down at work consists of panic about everything going on and she will obsessively think about dealing with certain children and not having the help she needs. She feels she is not equipped to handle these things at work. She has no suicidal thoughts . The panic happens in the evening when she thinks about going back to work. The panic does not happen socially or non work related.   She does feel the lexapro has been helping when it does kick in.         The following portions of the patient's history were reviewed and updated as appropriate: allergies, current medications, past family history, past medical history, past social history, past surgical history, and problem list.    OBJECTIVE:  /62   Pulse 70   Temp 98.4 °F (36.9 °C) (Temporal)   Resp 21   Ht 172.7 cm (67.99\")   Wt 67.6 kg (149 lb)   SpO2 98%   BMI 22.66 kg/m²       Physical Exam  Constitutional:       General: She is not in acute distress.     Appearance: Normal appearance.   HENT:      Head: Normocephalic and atraumatic.   Eyes:      Extraocular Movements: Extraocular movements intact.   Skin:     Findings: No rash.   Neurological:      General: No focal deficit present.      Mental Status: She is alert.   Psychiatric:         Mood and Affect: Mood normal.                  Assessment and Plan   Diagnoses and all orders for this visit:    1. Anxiety (Primary)      The patient " will call her psychiatrist to get a sooner apt and to get established with a new therapist this week.   We discussed therapy will be needed while off work to help her while she has time off.   Will help her with fmla while she is working on anxiety. She may also consider switching to another class. While under extreme stress we will have her increase the lexapro to 20mg. She has apt already scheduled with psychiatry next week.     No follow-ups on file.       Vangie Ayala D.O.  [facility] [place]

## 2024-08-22 NOTE — LETTER
August 22, 2024     Patient: Daisy Barton   YOB: 2000   Date of Visit: 8/22/2024       To Whom It May Concern:    It is my medical opinion that Daisy Barton should be excused from work 8/26/24-9/6/24.       Sincerely,        Vangie Ayala DO    CC: No Recipients

## 2024-09-12 RX ORDER — PROPRANOLOL HCL 60 MG
60 CAPSULE, EXTENDED RELEASE 24HR ORAL DAILY
Qty: 90 CAPSULE | Refills: 0 | Status: SHIPPED | OUTPATIENT
Start: 2024-09-12

## 2024-12-30 RX ORDER — PROPRANOLOL HYDROCHLORIDE 60 MG/1
60 CAPSULE, EXTENDED RELEASE ORAL DAILY
Qty: 90 CAPSULE | Refills: 0 | Status: SHIPPED | OUTPATIENT
Start: 2024-12-30

## 2025-03-31 RX ORDER — PROPRANOLOL HYDROCHLORIDE 60 MG/1
60 CAPSULE, EXTENDED RELEASE ORAL DAILY
Qty: 90 CAPSULE | Refills: 0 | Status: SHIPPED | OUTPATIENT
Start: 2025-03-31

## 2025-04-10 ENCOUNTER — OFFICE VISIT (OUTPATIENT)
Dept: OBSTETRICS AND GYNECOLOGY | Facility: CLINIC | Age: 25
End: 2025-04-10
Payer: COMMERCIAL

## 2025-04-10 VITALS
DIASTOLIC BLOOD PRESSURE: 80 MMHG | HEIGHT: 67 IN | SYSTOLIC BLOOD PRESSURE: 110 MMHG | BODY MASS INDEX: 25.55 KG/M2 | WEIGHT: 162.8 LBS

## 2025-04-10 DIAGNOSIS — Z01.419 WELL WOMAN EXAM WITH ROUTINE GYNECOLOGICAL EXAM: Primary | ICD-10-CM

## 2025-04-10 DIAGNOSIS — K59.09 CHRONIC CONSTIPATION: ICD-10-CM

## 2025-04-10 PROBLEM — Z97.5 IUD (INTRAUTERINE DEVICE) IN PLACE: Status: RESOLVED | Noted: 2020-08-20 | Resolved: 2025-04-10

## 2025-04-10 PROBLEM — Z97.5 PRESENCE OF MIRENA IUD: Status: ACTIVE | Noted: 2025-04-10

## 2025-04-10 RX ORDER — ATOMOXETINE 25 MG/1
25 CAPSULE ORAL DAILY
COMMUNITY
Start: 2025-03-13

## 2025-04-10 RX ORDER — IBUPROFEN 800 MG/1
800 TABLET, FILM COATED ORAL EVERY 6 HOURS PRN
COMMUNITY
Start: 2024-12-27

## 2025-04-10 RX ORDER — ESCITALOPRAM OXALATE 20 MG/1
20 TABLET ORAL DAILY
COMMUNITY
Start: 2025-04-07

## 2025-04-10 NOTE — PROGRESS NOTES
Subjective   Chief Complaint   Patient presents with    Annual Exam     Well woman exam       History of Present Illness       presenting to be seen for her annual exam.   Her pap was year was non diagnostic.  She has concerns today for constipation. She states since starting her Vylar she has had worse constipation symptoms. She has used fiber and docusate sodium/miralax with inconsistent results. She has not seen a gi specialist but is interested in this today. She does not her constipation is worse around her cycles and she has pain with sic when her constipation is worse.       SEXUAL Hx:  She is currently sexually active.  In the past year there there has been NO new sexual partners.    Condoms are never used.  She would not like to be screened for STD's at today's exam.  Current birth control method: IUD - Mirena. Inserted 23  She is happy with her current method of contraception and does not want to discuss alternative methods of contraception.  MENSTRUAL Hx:  Patient's last menstrual period was 2025.  In the past 6 months her cycles have been irregular.  Her menstrual flow has been heavy some months and light other months.   Each month on average there are roughly 0 day(s) of very heavy flow.    Intermenstrual bleeding is absent.    Post-coital bleeding is absent.  Dysmenorrhea: moderate and is not affecting her activities of daily living  PMS: is not affecting her activities of daily living  Her cycles are not a source of concern for her that she wishes to discuss today.  HEALTH Hx:  She exercises regularly: yes.  She wears her seat belt: yes.  She has concerns about domestic violence: no.  OTHER THINGS SHE WANTS TO DISCUSS TODAY:  Nothing else    The following portions of the patient's history were reviewed and updated as appropriate:problem list, current medications, allergies, past family history, past medical history, past social history, and past surgical history.    Social History     "Tobacco Use      Smoking status: Never      Smokeless tobacco: Never    Review of Systems  Constitutional POS: nothing reported    NEG: anorexia or night sweats   Genitourinary POS: nothing reported    NEG: dysuria or hematuria      Gastointestinal POS: nothing reported    NEG: bloating, change in bowel habits, melena, or reflux symptoms   Integument POS: nothing reported    NEG: moles that are changing in size, shape, color or rashes   Breast POS: nothing reported    NEG: persistent breast lump, skin dimpling, or nipple discharge        Objective   /80 (BP Location: Left arm, Patient Position: Sitting, Cuff Size: Adult)   Ht 170.2 cm (67\")   Wt 73.8 kg (162 lb 12.8 oz)   LMP 04/07/2025   BMI 25.50 kg/m²     General:  well developed; well nourished  no acute distress  mentation appropriate   Skin:  No suspicious lesions seen   Thyroid: normal to inspection and palpation   Breasts:  Examined in supine position  Symmetric without masses or skin dimpling  Nipples normal without inversion, lesions or discharge  There are no palpable axillary nodes  Bilateral breast reduction scars noted    Abdomen: soft, non-tender; no masses  no umbilical or inguinal hernias are present  no hepato-splenomegaly   Pelvis: Clinical staff was present for exam  :  urethral meatus normal; urethra normal:  Vaginal:  normal pink mucosa without prolapse or lesions.  Cervix:  normal appearance. IUD strings noted extending through cervical os   Uterus:  normal size, shape and consistency.  Adnexa:  normal bimanual exam of the adnexa.  Rectal:  digital rectal exam not performed; anus visually normal appearing.     Physical Exam         Assessment     Assessment & Plan      Well woman with routine gynecological exam  Iud in place      Plan     Pap was done today.  If she does not receive the results of the Pap within 2 weeks  time, she was instructed to call to find out the results.  I explained to Daisy that the recommendations for " Pap smear interval in a low risk patient's has lengthened to 3 years time.  I encouraged her to be seen yearly for a full physical exam including breast and pelvic exam even during the off years when PAP's will not be performed.  Referral sent for gastroenterology referral   The importance of keeping all planned follow-up and taking all medications as prescribed was emphasized.  Today I discussed with Daisy the total recommended calcium intake for a premenopausal female is 1000 mg.  Ideally this should be from dietary sources.  I reviewed calcium content in various foods including milk, fortified orange juice and yogurt.  If she cannot get sufficient calcium through dietary means, it is recommended to supplement with either a multivitamin or calcium to reach her daily goal.  I also reviewed the difference in the bioavailability of calcium carbonate and calcium citrate containing supplements and the importance of taking calcium carbonate containing products with food.  Finally, vitamin D's role in calcium absorption was reviewed and a total daily vitamin D intake of 800 units was recommended.  Has completed her hpv vaccine seriers  Iud warning signs reviewed   Follow up for annual exam 1 year    No orders of the defined types were placed in this encounter.             This note was electronically signed.    Claudia Juares, APRN  April 10, 2025

## 2025-04-11 LAB — REF LAB TEST METHOD: NORMAL

## 2025-06-27 RX ORDER — PROPRANOLOL HYDROCHLORIDE 60 MG/1
60 CAPSULE, EXTENDED RELEASE ORAL DAILY
Qty: 90 CAPSULE | Refills: 0 | Status: SHIPPED | OUTPATIENT
Start: 2025-06-27